# Patient Record
Sex: FEMALE | Race: BLACK OR AFRICAN AMERICAN | NOT HISPANIC OR LATINO | URBAN - METROPOLITAN AREA
[De-identification: names, ages, dates, MRNs, and addresses within clinical notes are randomized per-mention and may not be internally consistent; named-entity substitution may affect disease eponyms.]

---

## 2020-10-10 ENCOUNTER — INPATIENT (INPATIENT)
Facility: HOSPITAL | Age: 85
LOS: 4 days | Discharge: HOME | DRG: 542 | End: 2020-10-15
Attending: INTERNAL MEDICINE | Admitting: INTERNAL MEDICINE
Payer: COMMERCIAL

## 2020-10-10 VITALS
HEIGHT: 72 IN | RESPIRATION RATE: 20 BRPM | DIASTOLIC BLOOD PRESSURE: 84 MMHG | OXYGEN SATURATION: 97 % | HEART RATE: 78 BPM | SYSTOLIC BLOOD PRESSURE: 122 MMHG

## 2020-10-10 DIAGNOSIS — I10 ESSENTIAL (PRIMARY) HYPERTENSION: ICD-10-CM

## 2020-10-10 DIAGNOSIS — Z29.9 ENCOUNTER FOR PROPHYLACTIC MEASURES, UNSPECIFIED: ICD-10-CM

## 2020-10-10 DIAGNOSIS — R53.83 OTHER FATIGUE: ICD-10-CM

## 2020-10-10 DIAGNOSIS — I82.409 ACUTE EMBOLISM AND THROMBOSIS OF UNSPECIFIED DEEP VEINS OF UNSPECIFIED LOWER EXTREMITY: ICD-10-CM

## 2020-10-10 DIAGNOSIS — C50.919 MALIGNANT NEOPLASM OF UNSPECIFIED SITE OF UNSPECIFIED FEMALE BREAST: ICD-10-CM

## 2020-10-10 DIAGNOSIS — E83.52 HYPERCALCEMIA: ICD-10-CM

## 2020-10-10 DIAGNOSIS — N39.0 URINARY TRACT INFECTION, SITE NOT SPECIFIED: ICD-10-CM

## 2020-10-10 LAB
ALBUMIN SERPL ELPH-MCNC: 2.7 G/DL — LOW (ref 3.5–5)
ALP SERPL-CCNC: 183 U/L — HIGH (ref 40–120)
ALT FLD-CCNC: 82 U/L DA — HIGH (ref 10–60)
ANION GAP SERPL CALC-SCNC: 10 MMOL/L — SIGNIFICANT CHANGE UP (ref 5–17)
APPEARANCE UR: ABNORMAL
APTT BLD: 32.1 SEC — SIGNIFICANT CHANGE UP (ref 27.5–35.5)
AST SERPL-CCNC: 146 U/L — HIGH (ref 10–40)
BACTERIA # UR AUTO: ABNORMAL /HPF
BASOPHILS # BLD AUTO: 0.02 K/UL — SIGNIFICANT CHANGE UP (ref 0–0.2)
BASOPHILS NFR BLD AUTO: 0.3 % — SIGNIFICANT CHANGE UP (ref 0–2)
BILIRUB SERPL-MCNC: 1.5 MG/DL — HIGH (ref 0.2–1.2)
BILIRUB UR-MCNC: NEGATIVE — SIGNIFICANT CHANGE UP
BUN SERPL-MCNC: 18 MG/DL — SIGNIFICANT CHANGE UP (ref 7–18)
CALCIUM SERPL-MCNC: 12.3 MG/DL — HIGH (ref 8.4–10.5)
CHLORIDE SERPL-SCNC: 103 MMOL/L — SIGNIFICANT CHANGE UP (ref 96–108)
CO2 SERPL-SCNC: 20 MMOL/L — LOW (ref 22–31)
COLOR SPEC: YELLOW — SIGNIFICANT CHANGE UP
CREAT SERPL-MCNC: 1.28 MG/DL — SIGNIFICANT CHANGE UP (ref 0.5–1.3)
DIFF PNL FLD: ABNORMAL
EOSINOPHIL # BLD AUTO: 0.06 K/UL — SIGNIFICANT CHANGE UP (ref 0–0.5)
EOSINOPHIL NFR BLD AUTO: 0.8 % — SIGNIFICANT CHANGE UP (ref 0–6)
EPI CELLS # UR: ABNORMAL /HPF
GLUCOSE SERPL-MCNC: 131 MG/DL — HIGH (ref 70–99)
GLUCOSE UR QL: NEGATIVE — SIGNIFICANT CHANGE UP
HCT VFR BLD CALC: 39.3 % — SIGNIFICANT CHANGE UP (ref 34.5–45)
HGB BLD-MCNC: 13.4 G/DL — SIGNIFICANT CHANGE UP (ref 11.5–15.5)
IMM GRANULOCYTES NFR BLD AUTO: 0.4 % — SIGNIFICANT CHANGE UP (ref 0–1.5)
INR BLD: 1.12 RATIO — SIGNIFICANT CHANGE UP (ref 0.88–1.16)
KETONES UR-MCNC: NEGATIVE — SIGNIFICANT CHANGE UP
LEUKOCYTE ESTERASE UR-ACNC: ABNORMAL
LYMPHOCYTES # BLD AUTO: 1.79 K/UL — SIGNIFICANT CHANGE UP (ref 1–3.3)
LYMPHOCYTES # BLD AUTO: 24.3 % — SIGNIFICANT CHANGE UP (ref 13–44)
MAGNESIUM SERPL-MCNC: 1.6 MG/DL — SIGNIFICANT CHANGE UP (ref 1.6–2.6)
MCHC RBC-ENTMCNC: 28.5 PG — SIGNIFICANT CHANGE UP (ref 27–34)
MCHC RBC-ENTMCNC: 34.1 GM/DL — SIGNIFICANT CHANGE UP (ref 32–36)
MCV RBC AUTO: 83.4 FL — SIGNIFICANT CHANGE UP (ref 80–100)
MONOCYTES # BLD AUTO: 0.6 K/UL — SIGNIFICANT CHANGE UP (ref 0–0.9)
MONOCYTES NFR BLD AUTO: 8.1 % — SIGNIFICANT CHANGE UP (ref 2–14)
NEUTROPHILS # BLD AUTO: 4.87 K/UL — SIGNIFICANT CHANGE UP (ref 1.8–7.4)
NEUTROPHILS NFR BLD AUTO: 66.1 % — SIGNIFICANT CHANGE UP (ref 43–77)
NITRITE UR-MCNC: POSITIVE
NRBC # BLD: 0 /100 WBCS — SIGNIFICANT CHANGE UP (ref 0–0)
PH UR: 5 — SIGNIFICANT CHANGE UP (ref 5–8)
PLATELET # BLD AUTO: 288 K/UL — SIGNIFICANT CHANGE UP (ref 150–400)
POTASSIUM SERPL-MCNC: 4.6 MMOL/L — SIGNIFICANT CHANGE UP (ref 3.5–5.3)
POTASSIUM SERPL-SCNC: 4.6 MMOL/L — SIGNIFICANT CHANGE UP (ref 3.5–5.3)
PROT SERPL-MCNC: 7.5 G/DL — SIGNIFICANT CHANGE UP (ref 6–8.3)
PROT UR-MCNC: 15
PROTHROM AB SERPL-ACNC: 13.3 SEC — SIGNIFICANT CHANGE UP (ref 10.6–13.6)
RBC # BLD: 4.71 M/UL — SIGNIFICANT CHANGE UP (ref 3.8–5.2)
RBC # FLD: 15.5 % — HIGH (ref 10.3–14.5)
RBC CASTS # UR COMP ASSIST: ABNORMAL /HPF (ref 0–2)
SARS-COV-2 RNA SPEC QL NAA+PROBE: SIGNIFICANT CHANGE UP
SODIUM SERPL-SCNC: 133 MMOL/L — LOW (ref 135–145)
SP GR SPEC: 1.01 — SIGNIFICANT CHANGE UP (ref 1.01–1.02)
TROPONIN I SERPL-MCNC: 0.04 NG/ML — SIGNIFICANT CHANGE UP (ref 0–0.04)
UROBILINOGEN FLD QL: NEGATIVE — SIGNIFICANT CHANGE UP
WBC # BLD: 7.37 K/UL — SIGNIFICANT CHANGE UP (ref 3.8–10.5)
WBC # FLD AUTO: 7.37 K/UL — SIGNIFICANT CHANGE UP (ref 3.8–10.5)
WBC UR QL: ABNORMAL /HPF (ref 0–5)

## 2020-10-10 PROCEDURE — 71046 X-RAY EXAM CHEST 2 VIEWS: CPT | Mod: 26

## 2020-10-10 PROCEDURE — 74177 CT ABD & PELVIS W/CONTRAST: CPT | Mod: 26

## 2020-10-10 PROCEDURE — 71260 CT THORAX DX C+: CPT | Mod: 26

## 2020-10-10 PROCEDURE — 99285 EMERGENCY DEPT VISIT HI MDM: CPT

## 2020-10-10 RX ORDER — SODIUM CHLORIDE 9 MG/ML
1000 INJECTION INTRAMUSCULAR; INTRAVENOUS; SUBCUTANEOUS
Refills: 0 | Status: DISCONTINUED | OUTPATIENT
Start: 2020-10-10 | End: 2020-10-13

## 2020-10-10 RX ORDER — CEFTRIAXONE 500 MG/1
INJECTION, POWDER, FOR SOLUTION INTRAMUSCULAR; INTRAVENOUS
Refills: 0 | Status: DISCONTINUED | OUTPATIENT
Start: 2020-10-10 | End: 2020-10-15

## 2020-10-10 RX ORDER — CEFTRIAXONE 500 MG/1
1000 INJECTION, POWDER, FOR SOLUTION INTRAMUSCULAR; INTRAVENOUS EVERY 24 HOURS
Refills: 0 | Status: DISCONTINUED | OUTPATIENT
Start: 2020-10-11 | End: 2020-10-15

## 2020-10-10 RX ORDER — AMLODIPINE BESYLATE 2.5 MG/1
5 TABLET ORAL DAILY
Refills: 0 | Status: DISCONTINUED | OUTPATIENT
Start: 2020-10-10 | End: 2020-10-15

## 2020-10-10 RX ORDER — CEFTRIAXONE 500 MG/1
1000 INJECTION, POWDER, FOR SOLUTION INTRAMUSCULAR; INTRAVENOUS ONCE
Refills: 0 | Status: COMPLETED | OUTPATIENT
Start: 2020-10-10 | End: 2020-10-10

## 2020-10-10 RX ORDER — ASPIRIN/CALCIUM CARB/MAGNESIUM 324 MG
325 TABLET ORAL ONCE
Refills: 0 | Status: COMPLETED | OUTPATIENT
Start: 2020-10-10 | End: 2020-10-10

## 2020-10-10 RX ORDER — SODIUM CHLORIDE 9 MG/ML
1000 INJECTION INTRAMUSCULAR; INTRAVENOUS; SUBCUTANEOUS ONCE
Refills: 0 | Status: COMPLETED | OUTPATIENT
Start: 2020-10-10 | End: 2020-10-10

## 2020-10-10 RX ADMIN — CEFTRIAXONE 1000 MILLIGRAM(S): 500 INJECTION, POWDER, FOR SOLUTION INTRAMUSCULAR; INTRAVENOUS at 23:36

## 2020-10-10 RX ADMIN — SODIUM CHLORIDE 1000 MILLILITER(S): 9 INJECTION INTRAMUSCULAR; INTRAVENOUS; SUBCUTANEOUS at 18:30

## 2020-10-10 RX ADMIN — SODIUM CHLORIDE 1000 MILLILITER(S): 9 INJECTION INTRAMUSCULAR; INTRAVENOUS; SUBCUTANEOUS at 22:23

## 2020-10-10 RX ADMIN — SODIUM CHLORIDE 100 MILLILITER(S): 9 INJECTION INTRAMUSCULAR; INTRAVENOUS; SUBCUTANEOUS at 21:27

## 2020-10-10 RX ADMIN — SODIUM CHLORIDE 1000 MILLILITER(S): 9 INJECTION INTRAMUSCULAR; INTRAVENOUS; SUBCUTANEOUS at 17:10

## 2020-10-10 NOTE — H&P ADULT - NSHPPHYSICALEXAM_GEN_ALL_CORE
PHYSICAL EXAM:  GENERAL: NAD, speaks in full sentences, no signs of respiratory distress  HEAD:  Atraumatic, Normocephalic  EYES: EOMI, PERRLA, conjunctiva and sclera clear  NECK: Supple, No JVD  CHEST/LUNG: Clear to auscultation bilaterally; No wheeze; No crackles; No accessory muscles used, Mastectomy scar  HEART: Regular rate and rhythm; No murmurs;   ABDOMEN: Soft, Nontender, Nondistended; Bowel sounds present; No guarding  EXTREMITIES:  2+ Peripheral Pulses, No cyanosis or edema  PSYCH: AAOx3  NEUROLOGY: non-focal  SKIN: No rashes or lesions

## 2020-10-10 NOTE — ED PROVIDER NOTE - PMH
Arthritis    Cholecystitis    Diabetes mellitus    Gout    Hyperlipidemia    Hypertension    Vitamin D deficiency

## 2020-10-10 NOTE — ED PROVIDER NOTE - PROGRESS NOTE DETAILS
Hypercalcemia with a calcium level of 12.3 and calcitonin level of 100. Troponin negative. Will obtain CT of chest, abdomen/pelvis. Suspicion of metastatic etiology given hypercalcemia, troponin negative. WBC normal. Will admit for hypercalcemia with imaging and IV antibiotics. radiologist report received. dvt in femoral extending to IVC, metastatic disesae. Dr. Donaldson at bedside. Admitted for inpatient management.

## 2020-10-10 NOTE — ED PROVIDER NOTE - OBJECTIVE STATEMENT
89 y/o F pt with a significant PMHx of breast cancer s/p remission 10 years ago, DM, HTN and no significant PSHx presents to the ED with c/o generalized fatigue. Patient states extremely tired, decreased appetite. Patient reports normal walking is with a cane. Patient reports unable to tolerate walking with a cane. Pt states living with her son, had labs done yesterday. Patient's son states liver and calcium enzymes in the labs were high. Patient denies any syncope, fever, chills, chest pain, shortness of breath, abdominal pain, paresthesia, numbness or any other complains.

## 2020-10-10 NOTE — H&P ADULT - ASSESSMENT
87 y/o F pt with a significant PMHx of breast cancer(finished radiotherapy in July)  HTN and no significant PSHx presents to the ED with c/o generalized fatigue  Admitting for failure to thrive and metastatic cancer evaluation

## 2020-10-10 NOTE — H&P ADULT - PROBLEM SELECTOR PLAN 6
RISK                                                          Points  [] Previous VTE                                           3  [] Thrombophilia                                        2  [] Lower limb paralysis                              2   [x] Current Cancer                                       2   [x] Immobilization > 24 hrs                        1  [] ICU/CCU stay > 24 hours                       1  [x] Age > 60                                                   1    Improve score 4  Heparin drip for DVT Patient takes Amlodipine 5mg   Will continue with parameters   Dash diet  f.u A1C, Lipid profile

## 2020-10-10 NOTE — H&P ADULT - PROBLEM SELECTOR PLAN 5
Patient takes Amlodipine 5mg   Will continue with parameters   Dash diet  f.u A1C, Lipid profile Thrombosis within left femoral vein extending into the IVC. No pulmonary embolus.  Will start on Heparin drip once CT head is back, and there is no evidence of bleed in the brain   - Monitor PTT

## 2020-10-10 NOTE — ED PROVIDER NOTE - CLINICAL SUMMARY MEDICAL DECISION MAKING FREE TEXT BOX
Pt with breast CA and as per above with generalized fatigue. Found to have hypercalcemia yesterday. Evaluate for metabolic abnormalities. Aspecific for ACS, possible METS due to the history and calcium levels. Pt with breast CA and as per above p/w generalized fatigue. Found to have hypercalcemia yesterday. will eval for metabolic derangements. presentation suspicious for metastatic disease. will also eval for cardiac, infectious etiology.

## 2020-10-10 NOTE — H&P ADULT - PROBLEM SELECTOR PLAN 4
Thrombosis within left femoral vein extending into the IVC. No pulmonary embolus.  Will start on Heparin drip once CT head is back, and there is no evidence of bleed in the brain   - Monitor PTT Patient has Calcium 12.3   corrected calcium 13.9  -Most likely due to malignancy  - Will start with IV fluids   -f/u Vitamin D and PTH rP to rule out other causes of malignancy (  - f/u outpaitent records from oncologist for   - Oncologist Dr Gold

## 2020-10-10 NOTE — H&P ADULT - NSICDXPASTMEDICALHX_GEN_ALL_CORE_FT
PAST MEDICAL HISTORY:  Arthritis     Cholecystitis     Diabetes mellitus     Gout     Hyperlipidemia     Hypertension     Vitamin D deficiency

## 2020-10-10 NOTE — ED PROVIDER NOTE - CONSTITUTIONAL APPEARANCE HYGIENE, MLM
nontoxic appearing female lying on the stretcher, patient is clearly fatigued and needs to take a break per sentence

## 2020-10-10 NOTE — H&P ADULT - PROBLEM SELECTOR PLAN 7
RISK                                                          Points  [] Previous VTE                                           3  [] Thrombophilia                                        2  [] Lower limb paralysis                              2   [x] Current Cancer                                       2   [x] Immobilization > 24 hrs                        1  [] ICU/CCU stay > 24 hours                       1  [x] Age > 60                                                   1    Improve score 4  Heparin drip for DVT

## 2020-10-10 NOTE — H&P ADULT - ATTENDING COMMENTS
Seen and examined . Seen and examined  earlier with son in room . Son said that Chemo was stopped in July as her oncologist said she was fine now. Will try to find from her oncologist reason for stopping chemo. CT scan findings explained in detail . Awaiting CT Head and will start Heparin for DVT. Oncology consulted. Progosis is poor. P full code . Will consult palliative also.

## 2020-10-10 NOTE — ED ADULT NURSE NOTE - NSIMPLEMENTINTERV_GEN_ALL_ED
Implemented All Fall Risk Interventions:  [place] to call system. Call bell, personal items and telephone within reach. Instruct patient to call for assistance. Room bathroom lighting operational. Non-slip footwear when patient is off stretcher. Physically safe environment: no spills, clutter or unnecessary equipment. Stretcher in lowest position, wheels locked, appropriate side rails in place. Provide visual cue, wrist band, yellow gown, etc. Monitor gait and stability. Monitor for mental status changes and reorient to person, place, and time. Review medications for side effects contributing to fall risk. Reinforce activity limits and safety measures with patient and family.

## 2020-10-10 NOTE — H&P ADULT - NSHPREVIEWOFSYSTEMS_GEN_ALL_CORE
.  CONSTITUTIONAL: No weakness, fevers or chills  EYES/ENT: No visual changes;  No vertigo or throat pain   NECK: No pain or stiffness  RESPIRATORY: No cough, wheezing, hemoptysis; No shortness of breath  CARDIOVASCULAR: No chest pain or palpitations  GASTROINTESTINAL: No abdominal or epigastric pain. No nausea, vomiting, or hematemesis; No diarrhea or constipation. No melena or hematochezia.  GENITOURINARY: No dysuria, frequency or hematuria  NEUROLOGICAL: No numbness or weakness  SKIN: No itching, burning, rashes, or lesions   All other review of systems is negative unless indicated above. CONSTITUTIONAL: generalized weakness, anorexia, No fevers or chills  EYES/ENT: No visual changes;  No vertigo or throat pain   NECK: No pain or stiffness  RESPIRATORY: No cough, wheezing, hemoptysis; No shortness of breath  CARDIOVASCULAR: No chest pain or palpitations  GASTROINTESTINAL: No abdominal or epigastric pain. No nausea, vomiting, or hematemesis; No diarrhea or constipation. No melena or hematochezia.  GENITOURINARY: No dysuria, frequency or hematuria  NEUROLOGICAL: No numbness or weakness  SKIN: No itching, burning, rashes, or lesions   All other review of systems is negative unless indicated above.

## 2020-10-10 NOTE — ED PROVIDER NOTE - CHPI ED SYMPTOMS NEG
no numbness/no chest pain, no shortness of breath, no abdominal pain, no paresthesia,/no fever/no chills

## 2020-10-10 NOTE — H&P ADULT - PROBLEM SELECTOR PLAN 3
Patient has Calcium 12.3   corrected calcium 13.9  -Most likely due to malignancy  - Will start with IV fluids   -f/u Vitamin D and PTH rP to rule out other causes of malignancy (  - f/u outpaitent records from oncologist for   - Oncologist Dr Gold patient presents with generalized fatigue , weakness, anorexia and weight loss since 1 week  - Also reports significant weight loss in last 1 month  - H/O Breast cancer, stopped radiation therapy in July because as per patient, her oncologist recommended that her cancer has gone  - Pan CT of the body shows hypodense lesion in the liver, bones and irregular bladder wall thickening  - UA also positive , No fever or Wbc   - Calcium 12.3 and Albumin 2.7  - Likely due to metastatic cancer   - Will start with NS at 100ml/hr for 24 hours for initial management of hypercalcemia  - f/u Vit D1-25 , PTHrP,   - f/u with Dr Gold for further recommendations   - outpatient records from the oncologist

## 2020-10-10 NOTE — H&P ADULT - PROBLEM SELECTOR PLAN 1
patient presents with generalized fatigue , weakness, anorexia and weight loss since 1 week  - Also reports significant weight loss in last 1 month  - H/O Breast cancer, stopped radiation therapy in July because as per patient, her oncologist recommended that her cancer has gone  - Pan CT of the body shows hypodense lesion in the liver, bones and irregular bladder wall thickening  - UA also positive , No fever or Wbc   - Calcium 12.3 and Albumin 2.7  - Likely due to metastatic cancer   - Will start with NS at 100ml/hr for 24 hours for initial management of hypercalcemia  - f/u Vit D1-25 , PTHrP,   - f/u with Dr Gold for further recommendations

## 2020-10-10 NOTE — H&P ADULT - PROBLEM SELECTOR PLAN 2
patient presents with generalized fatigue , weakness, anorexia and weight loss since 1 week  - Also reports significant weight loss in last 1 month  - H/O Breast cancer, stopped radiation therapy in July because as per patient, her oncologist recommended that her cancer has gone  - Pan CT of the body shows hypodense lesion in the liver, bones and irregular bladder wall thickening  - UA also positive , No fever or Wbc   - Calcium 12.3 and Albumin 2.7  - Likely due to metastatic cancer   - Will start with NS at 100ml/hr for 24 hours for initial management of hypercalcemia  - f/u Vit D1-25 , PTHrP,   - f/u with Dr Gold for further recommendations   - outpatient records from the oncologist - patient presents with generalized weakness  - positive UA , no other signs of infection   - afebrile   - no Leukocytosis  - Lactate normal   - Will start with Rocephin   - f/u blood cultures (specimen received)   - f/u urine cultures (specimen received)

## 2020-10-10 NOTE — ED PROVIDER NOTE - MUSCULOSKELETAL, MLM
Spine appears normal, range of motion is not limited, no muscle or joint tenderness, no pitting edema

## 2020-10-10 NOTE — H&P ADULT - NSICDXFAMILYHX_GEN_ALL_CORE_FT
FAMILY HISTORY:  Father  Still living? Unknown  Family history of hypertension, Age at diagnosis: Age Unknown    Mother  Still living? Unknown  Family history of diabetes mellitus (DM), Age at diagnosis: Age Unknown  Family history of heart disease, Age at diagnosis: Age Unknown    Sibling  Still living? Unknown  Family history of diabetes mellitus (DM), Age at diagnosis: Age Unknown

## 2020-10-10 NOTE — H&P ADULT - HISTORY OF PRESENT ILLNESS
87 y/o F pt with a significant PMHx of breast cancer s/p remission 10 years ago, DM, HTN and no significant PSHx presents to the ED with c/o generalized fatigue. Patient states extremely tired, decreased appetite. Patient reports normal walking is with a cane. Patient reports unable to tolerate walking with a cane. Pt states living with her son, had labs done yesterday. Patient's son states liver and calcium enzymes in the labs were high. Patient denies any syncope, fever, chills, chest pain, shortness of breath, abdominal pain, paresthesia, numbness or any other complains. 87 y/o F pt with a significant PMHx of breast cancer(finished radiotherapy in July)  HTN , questionable diabetes and no significant PSHx presents to the ED with c/o generalized fatigue. Patient states extremely tired, decreased appetite, unable to walk, since 1 week associated with significant weight loss since a month. Pt states living with her son, had labs done yesterday which showed that her calcium and liver enzyme levels are elevated.   Patient denies any syncope, fever, chills, chest pain, shortness of breath, abdominal pain, paresthesia, numbness or any other complains.    ED Course:   CT abdomen and Chest with IV contrast shows metastatic lesions in liver, lungs and osteolytic lesions in spine, Clots in femoral vein extending upto IVC  Vital Signs Last 24 Hrs  T(C): 37.1 (10 Oct 2020 19:45), Max: 37.1 (10 Oct 2020 19:45)  T(F): 98.8 (10 Oct 2020 19:45), Max: 98.8 (10 Oct 2020 19:45)  HR: 82 (10 Oct 2020 19:45) (78 - 82)  BP: 131/81 (10 Oct 2020 19:45) (122/84 - 131/81)  RR: 16 (10 Oct 2020 19:45) (16 - 20)  SpO2: 97% (10 Oct 2020 19:45) (97% - 97%)

## 2020-10-11 LAB
24R-OH-CALCIDIOL SERPL-MCNC: 40.8 NG/ML — SIGNIFICANT CHANGE UP (ref 30–80)
ALBUMIN SERPL ELPH-MCNC: 2.2 G/DL — LOW (ref 3.5–5)
ALP SERPL-CCNC: 158 U/L — HIGH (ref 40–120)
ALT FLD-CCNC: 72 U/L DA — HIGH (ref 10–60)
ANION GAP SERPL CALC-SCNC: 8 MMOL/L — SIGNIFICANT CHANGE UP (ref 5–17)
APTT BLD: 44.3 SEC — HIGH (ref 27.5–35.5)
APTT BLD: >200 SEC — CRITICAL HIGH (ref 27.5–35.5)
AST SERPL-CCNC: 137 U/L — HIGH (ref 10–40)
BILIRUB SERPL-MCNC: 1 MG/DL — SIGNIFICANT CHANGE UP (ref 0.2–1.2)
BUN SERPL-MCNC: 15 MG/DL — SIGNIFICANT CHANGE UP (ref 7–18)
CALCIUM SERPL-MCNC: 11.2 MG/DL — HIGH (ref 8.4–10.5)
CHLORIDE SERPL-SCNC: 104 MMOL/L — SIGNIFICANT CHANGE UP (ref 96–108)
CHOLEST SERPL-MCNC: 94 MG/DL — SIGNIFICANT CHANGE UP (ref 10–199)
CO2 SERPL-SCNC: 24 MMOL/L — SIGNIFICANT CHANGE UP (ref 22–31)
CREAT SERPL-MCNC: 1.1 MG/DL — SIGNIFICANT CHANGE UP (ref 0.5–1.3)
FERRITIN SERPL-MCNC: 796 NG/ML — HIGH (ref 15–150)
FOLATE SERPL-MCNC: 5 NG/ML — SIGNIFICANT CHANGE UP
GLUCOSE BLDC GLUCOMTR-MCNC: 104 MG/DL — HIGH (ref 70–99)
GLUCOSE BLDC GLUCOMTR-MCNC: 106 MG/DL — HIGH (ref 70–99)
GLUCOSE BLDC GLUCOMTR-MCNC: 106 MG/DL — HIGH (ref 70–99)
GLUCOSE BLDC GLUCOMTR-MCNC: 130 MG/DL — HIGH (ref 70–99)
GLUCOSE SERPL-MCNC: 115 MG/DL — HIGH (ref 70–99)
HCT VFR BLD CALC: 33.8 % — LOW (ref 34.5–45)
HDLC SERPL-MCNC: 53 MG/DL — SIGNIFICANT CHANGE UP
HGB BLD-MCNC: 11.6 G/DL — SIGNIFICANT CHANGE UP (ref 11.5–15.5)
IRON SATN MFR SERPL: 24 % — SIGNIFICANT CHANGE UP (ref 15–50)
IRON SATN MFR SERPL: 46 UG/DL — SIGNIFICANT CHANGE UP (ref 40–150)
LIPID PNL WITH DIRECT LDL SERPL: 31 MG/DL — SIGNIFICANT CHANGE UP
MAGNESIUM SERPL-MCNC: 1.6 MG/DL — SIGNIFICANT CHANGE UP (ref 1.6–2.6)
MCHC RBC-ENTMCNC: 28.5 PG — SIGNIFICANT CHANGE UP (ref 27–34)
MCHC RBC-ENTMCNC: 34.3 GM/DL — SIGNIFICANT CHANGE UP (ref 32–36)
MCV RBC AUTO: 83 FL — SIGNIFICANT CHANGE UP (ref 80–100)
NRBC # BLD: 0 /100 WBCS — SIGNIFICANT CHANGE UP (ref 0–0)
PHOSPHATE SERPL-MCNC: 2 MG/DL — LOW (ref 2.5–4.5)
PLATELET # BLD AUTO: 241 K/UL — SIGNIFICANT CHANGE UP (ref 150–400)
POTASSIUM SERPL-MCNC: 4.1 MMOL/L — SIGNIFICANT CHANGE UP (ref 3.5–5.3)
POTASSIUM SERPL-SCNC: 4.1 MMOL/L — SIGNIFICANT CHANGE UP (ref 3.5–5.3)
PROT SERPL-MCNC: 6.5 G/DL — SIGNIFICANT CHANGE UP (ref 6–8.3)
RBC # BLD: 4.07 M/UL — SIGNIFICANT CHANGE UP (ref 3.8–5.2)
RBC # FLD: 15.6 % — HIGH (ref 10.3–14.5)
SARS-COV-2 IGG SERPL QL IA: NEGATIVE — SIGNIFICANT CHANGE UP
SARS-COV-2 IGM SERPL IA-ACNC: 0.09 INDEX — SIGNIFICANT CHANGE UP
SODIUM SERPL-SCNC: 136 MMOL/L — SIGNIFICANT CHANGE UP (ref 135–145)
TIBC SERPL-MCNC: 191 UG/DL — LOW (ref 250–450)
TOTAL CHOLESTEROL/HDL RATIO MEASUREMENT: 1.8 RATIO — LOW (ref 3.3–7.1)
TRIGL SERPL-MCNC: 50 MG/DL — SIGNIFICANT CHANGE UP (ref 10–149)
TSH SERPL-MCNC: 0.5 UU/ML — SIGNIFICANT CHANGE UP (ref 0.34–4.82)
UIBC SERPL-MCNC: 145 UG/DL — SIGNIFICANT CHANGE UP (ref 110–370)
VIT B12 SERPL-MCNC: 1830 PG/ML — HIGH (ref 232–1245)
WBC # BLD: 7 K/UL — SIGNIFICANT CHANGE UP (ref 3.8–10.5)
WBC # FLD AUTO: 7 K/UL — SIGNIFICANT CHANGE UP (ref 3.8–10.5)

## 2020-10-11 PROCEDURE — 70450 CT HEAD/BRAIN W/O DYE: CPT | Mod: 26

## 2020-10-11 RX ORDER — HEPARIN SODIUM 5000 [USP'U]/ML
6500 INJECTION INTRAVENOUS; SUBCUTANEOUS EVERY 6 HOURS
Refills: 0 | Status: DISCONTINUED | OUTPATIENT
Start: 2020-10-11 | End: 2020-10-12

## 2020-10-11 RX ORDER — INFLUENZA VIRUS VACCINE 15; 15; 15; 15 UG/.5ML; UG/.5ML; UG/.5ML; UG/.5ML
0.5 SUSPENSION INTRAMUSCULAR ONCE
Refills: 0 | Status: DISCONTINUED | OUTPATIENT
Start: 2020-10-11 | End: 2020-10-15

## 2020-10-11 RX ORDER — HEPARIN SODIUM 5000 [USP'U]/ML
3000 INJECTION INTRAVENOUS; SUBCUTANEOUS EVERY 6 HOURS
Refills: 0 | Status: DISCONTINUED | OUTPATIENT
Start: 2020-10-11 | End: 2020-10-12

## 2020-10-11 RX ORDER — HEPARIN SODIUM 5000 [USP'U]/ML
INJECTION INTRAVENOUS; SUBCUTANEOUS
Qty: 25000 | Refills: 0 | Status: DISCONTINUED | OUTPATIENT
Start: 2020-10-11 | End: 2020-10-12

## 2020-10-11 RX ADMIN — HEPARIN SODIUM 1400 UNIT(S)/HR: 5000 INJECTION INTRAVENOUS; SUBCUTANEOUS at 19:53

## 2020-10-11 RX ADMIN — HEPARIN SODIUM 3000 UNIT(S): 5000 INJECTION INTRAVENOUS; SUBCUTANEOUS at 11:31

## 2020-10-11 RX ADMIN — AMLODIPINE BESYLATE 5 MILLIGRAM(S): 2.5 TABLET ORAL at 05:42

## 2020-10-11 RX ADMIN — CEFTRIAXONE 100 MILLIGRAM(S): 500 INJECTION, POWDER, FOR SOLUTION INTRAMUSCULAR; INTRAVENOUS at 21:20

## 2020-10-11 RX ADMIN — SODIUM CHLORIDE 100 MILLILITER(S): 9 INJECTION INTRAMUSCULAR; INTRAVENOUS; SUBCUTANEOUS at 05:01

## 2020-10-11 RX ADMIN — HEPARIN SODIUM 1700 UNIT(S)/HR: 5000 INJECTION INTRAVENOUS; SUBCUTANEOUS at 11:30

## 2020-10-11 RX ADMIN — HEPARIN SODIUM 1500 UNIT(S)/HR: 5000 INJECTION INTRAVENOUS; SUBCUTANEOUS at 05:00

## 2020-10-11 RX ADMIN — HEPARIN SODIUM 0 UNIT(S)/HR: 5000 INJECTION INTRAVENOUS; SUBCUTANEOUS at 18:45

## 2020-10-11 NOTE — DIETITIAN INITIAL EVALUATION ADULT. - OTHER INFO
Patient from home lives with son. Visited pt. alert but weak, son at bedside, per pt. poor intake became very poor x 1 week, consuming less than 1 meal or "nothing", has no "desire to eat", denies nausea/vomiting or diarrhea but complains of severe weakness & completed her chemo tx. in July 2020 PTA, has upper/lower dentures. Per pt.  Lbs & gradually loss >35 Lbs past 1 year & current wt. 188 Lbs, presently consuming ~30% of lunch meal, willing to have nutrition supplement with meals, encourage po intake with meal set up, followed by Nephro, Palliative Care team consulted. d/w RN.

## 2020-10-11 NOTE — DIETITIAN INITIAL EVALUATION ADULT. - DIET TYPE
soft/Nursing to continue feeding assistance and encouragement, aspiration precaution/DASH/TLC (sodium and cholesterol restricted diet)/consistent carbohydrate (evening snack)

## 2020-10-11 NOTE — DIETITIAN INITIAL EVALUATION ADULT. - PROBLEM SELECTOR PLAN 4
Patient has Calcium 12.3   corrected calcium 13.9  -Most likely due to malignancy  - Will start with IV fluids   -f/u Vitamin D and PTH rP to rule out other causes of malignancy (  - f/u outpaitent records from oncologist for   - Oncologist Dr Gold

## 2020-10-11 NOTE — CONSULT NOTE ADULT - ASSESSMENT
HYPERCALCEMIA: This is associate with bony lesions  and hepatic lesions, likely mets PO4 is low so possible  PTH related peptide induced.  However, may also be due to direct bone mets. Mild DALY associated.  - Hydrate with NS at 100 ml/hour.  - Follow up BMP.  - No Furosemide.  - PTH and PTHrP levels.  - If no response, Biphosphonate use as per Oncology.     DALY:  Likely pre-Renal from Dehydration due to Calcium induced diuresis.  - Hydrate.  - Follow up BMP.

## 2020-10-11 NOTE — DIETITIAN INITIAL EVALUATION ADULT. - PERTINENT MEDS FT
MEDICATIONS  (STANDING):  amLODIPine   Tablet 5 milliGRAM(s) Oral daily  aspirin 325 milliGRAM(s) Oral Once  cefTRIAXone   IVPB      cefTRIAXone   IVPB 1000 milliGRAM(s) IV Intermittent every 24 hours  heparin  Infusion.  Unit(s)/Hr (15 mL/Hr) IV Continuous <Continuous>  influenza   Vaccine 0.5 milliLiter(s) IntraMuscular once  sodium chloride 0.9%. 1000 milliLiter(s) (100 mL/Hr) IV Continuous <Continuous>  sodium chloride 0.9%. 1000 milliLiter(s) (100 mL/Hr) IV Continuous <Continuous>    MEDICATIONS  (PRN):  heparin   Injectable 6500 Unit(s) IV Push every 6 hours PRN For aPTT less than 40  heparin   Injectable 3000 Unit(s) IV Push every 6 hours PRN For aPTT between 40 - 57

## 2020-10-11 NOTE — CONSULT NOTE ADULT - SUBJECTIVE AND OBJECTIVE BOX
RENETTA BARRETO  Patient is a 88y old  Female who presents with a chief complaint of Generalized weakness (10 Oct 2020 20:00)    HPI:  This is a patient with Breast Ca who was treated with Radiation, admitted for weakness, anorexia and tiredness.   Noted to have Hypercalcemia.     PAST MEDICAL & SURGICAL HISTORY:  Vitamin D deficiency    Gout    Diabetes mellitus    Cholecystitis    Arthritis    Hyperlipidemia    Hypertension    History of cholecystectomy      MEDICATIONS  (STANDING):  amLODIPine   Tablet 5 milliGRAM(s) Oral daily  aspirin 325 milliGRAM(s) Oral Once  cefTRIAXone   IVPB      cefTRIAXone   IVPB 1000 milliGRAM(s) IV Intermittent every 24 hours  heparin  Infusion.  Unit(s)/Hr (15 mL/Hr) IV Continuous <Continuous>  influenza   Vaccine 0.5 milliLiter(s) IntraMuscular once  sodium chloride 0.9%. 1000 milliLiter(s) (100 mL/Hr) IV Continuous <Continuous>  sodium chloride 0.9%. 1000 milliLiter(s) (100 mL/Hr) IV Continuous <Continuous>    Allergies    Benadryl (Swelling)    Intolerances      FAMILY HISTORY:  Family history of heart disease (Mother)    Family history of diabetes mellitus (DM) (Mother, Sibling)    Family history of hypertension (Father)        REVIEW OF SYSTEMS    General:  No fever, chills or night sweats.    Ophthalmologic: No changes in vision.  	  ENMT: No difficulty swallowing. 	    Respiratory and Thorax: No cough, wheezes or dyspnea.  	  Cardiovascular: No chest pains, tiredness or palpitations.	    Gastrointestinal: No dyspepsia, constipation or diarrhea.	    Genitourinary:	 No dysuria, hematuria or frequency.    Musculoskeletal: No joint pains or swelling. No muscle pains.    Neurological:	No weakness or numbness. No seizures.    Hematology/Lymphatics: No heat or cold intolerance.    Endocrine: No polyuria or polydipsia.	      Vital Signs Last 24 Hrs  T(C): 36.7 (11 Oct 2020 14:30), Max: 37.1 (10 Oct 2020 19:45)  T(F): 98.1 (11 Oct 2020 14:30), Max: 98.8 (10 Oct 2020 19:45)  HR: 74 (11 Oct 2020 14:30) (71 - 82)  BP: 127/80 (11 Oct 2020 14:30) (127/80 - 159/88)  BP(mean): --  RR: 16 (11 Oct 2020 14:30) (16 - 16)  SpO2: 95% (11 Oct 2020 14:30) (95% - 98%)    PHYSICAL EXAMINATION:  Constitutional: She appears comfortable and not distressed. Not diaphoretic. Clinically dehydrated.     Neck:  The thyroid is normal. Trachea is midline.     Respiratory: The lungs are clear to auscultation. No dullness and expansion is normal.    Cardiovascular: S1 and S2 are normal. No mummurs, rubs or gallops are present.    Gastrointestinal: The abdomen is soft. No tenderness is present. No masses are present. Bowel sounds are normal.    Genitourinary: The bladder is not distended. No CVA tenderness is present.    Extremities: No edema is noted. No deformities are present.    Neurological: Cognition is normal. Tone, power and sensation are normal.     Skin: No lesions are seen  or palpated.    Lymph Nodes: No lymphadenopathy is present.    Psychiatric: Mood is appropriate. No hallucinations or flight of ideas are noted.                            11.6   7.00  )-----------( 241      ( 11 Oct 2020 11:08 )             33.8     10-11    136  |  104  |  15  ----------------------------<  115<H>  4.1   |  24  |  1.10    Ca    11.2<H>      11 Oct 2020 11:08  Phos  2.0     10-11  Mg     1.6     10-11    TPro  6.5  /  Alb  2.2<L>  /  TBili  1.0  /  DBili  x   /  AST  137<H>  /  ALT  72<H>  /  AlkPhos  158<H>  10-11    LIVER FUNCTIONS - ( 11 Oct 2020 11:08 )  Alb: 2.2 g/dL / Pro: 6.5 g/dL / ALK PHOS: 158 U/L / ALT: 72 U/L DA / AST: 137 U/L / GGT: x           Urinalysis Basic - ( 10 Oct 2020 22:28 )    Color: Yellow / Appearance: Slightly Turbid / S.015 / pH: x  Gluc: x / Ketone: Negative  / Bili: Negative / Urobili: Negative   Blood: x / Protein: 15 / Nitrite: Positive   Leuk Esterase: Small / RBC: 2-5 /HPF / WBC 6-10 /HPF   Sq Epi: x / Non Sq Epi: Moderate /HPF / Bacteria: TNTC /HPF

## 2020-10-11 NOTE — CHART NOTE - NSCHARTNOTEFT_GEN_A_CORE
Upon Nutritional Assessment by the Registered Dietitian your patient was determined to meet criteria / has evidence of the following diagnosis/diagnoses:          [ ]  Mild Protein Calorie Malnutrition        [ ]  Moderate Protein Calorie Malnutrition        [X ] Severe Protein Calorie Malnutrition        [ ] Unspecified Protein Calorie Malnutrition        [ ] Underweight / BMI <19        [ ] Morbid Obesity / BMI > 40      Findings as based on:  •  Comprehensive nutrition assessment and consultation  •  Calorie counts (nutrient intake analysis)  •  Food acceptance and intake status from observations by staff  •  Follow up  •  Patient education  •  Intervention secondary to interdisciplinary rounds  •   concerns      Treatment:    The following diet has been recommended:      PROVIDER Section:     By signing this assessment you are acknowledging and agree with the diagnosis/diagnoses assigned by the Registered Dietitian    Comments:  Add Glucerna Shake 1can bid (440kcal, 20g protein) & MVI/minerals daily as medically feasible

## 2020-10-11 NOTE — DIETITIAN INITIAL EVALUATION ADULT. - PERTINENT LABORATORY DATA
10-11 Na136 mmol/L Glu 115 mg/dL<H> K+ 4.1 mmol/L Cr  1.10 mg/dL BUN 15 mg/dL 10-11 Phos 2.0 mg/dL<L> 10-11 Alb 2.2 g/dL<L> 10-11 Chol 94 mg/dL LDL 31 mg/dL HDL 53 mg/dL Trig 50 mg/dL

## 2020-10-11 NOTE — CHART NOTE - NSCHARTNOTEFT_GEN_A_CORE
EVENT: Paged by RN, PTT is >200.     HPI:  87 y/o F pt with a significant PMHx of breast cancer(finished radiotherapy in July)  HTN , questionable diabetes and no significant PSHx presents to the ED with c/o generalized fatigue. Patient states extremely tired, decreased appetite, unable to walk, since 1 week associated with significant weight loss since a month. Pt states living with her son, had labs done yesterday which showed that her calcium and liver enzyme levels are elevated. Patient denies any syncope, fever, chills, chest pain, shortness of breath, abdominal pain, paresthesia, numbness or any other complains. ED Course:   CT abdomen and Chest with IV contrast shows metastatic lesions in liver, lungs and osteolytic lesions in spine, Clots in femoral vein extending up to IVC. Pt was started on heparin gtt.     SUBJECTIVE: No new complaints     OBJECTIVE:  Vital Signs Last 24 Hrs  T(C): 36.7 (11 Oct 2020 14:30), Max: 37.1 (10 Oct 2020 19:45)  T(F): 98.1 (11 Oct 2020 14:30), Max: 98.8 (10 Oct 2020 19:45)  HR: 74 (11 Oct 2020 14:30) (71 - 82)  BP: 127/80 (11 Oct 2020 14:30) (127/80 - 159/88)  BP(mean): --  RR: 16 (11 Oct 2020 14:30) (16 - 16)  SpO2: 95% (11 Oct 2020 14:30) (95% - 98%)    LABS:                        11.6   7.00  )-----------( 241      ( 11 Oct 2020 11:08 )             33.8   CARDIAC MARKERS ( 10 Oct 2020 17:20 )  0.037 ng/mL / x     / x     / x     / x        10-11    136  |  104  |  15  ----------------------------<  115<H>  4.1   |  24  |  1.10    Ca    11.2<H>      11 Oct 2020 11:08  Phos  2.0     10-11  Mg     1.6     10-11    TPro  6.5  /  Alb  2.2<L>  /  TBili  1.0  /  DBili  x   /  AST  137<H>  /  ALT  72<H>  /  AlkPhos  158<H>  10-11        Vital Signs Last 24 Hrs  T(C): 37.1 (10 Oct 2020 19:45), Max: 37.1 (10 Oct 2020 19:45)  T(F): 98.8 (10 Oct 2020 19:45), Max: 98.8 (10 Oct 2020 19:45)  HR: 82 (10 Oct 2020 19:45) (78 - 82)  BP: 131/81 (10 Oct 2020 19:45) (122/84 - 131/81)  RR: 16 (10 Oct 2020 19:45) (16 - 20)  SpO2: 97% (10 Oct 2020 19:45) (97% - 97%) (10 Oct 2020 20:00)      PLAN:   -Heparin gtt held x 1 hr per nomogram  -Restart at new rate, per nomogram  -Monitor PTT  -Discussed with RN EVENT: Paged by RN, PTT is >200.     HPI:  89 y/o F pt with a significant PMHx of breast cancer(finished radiotherapy in July)  HTN , questionable diabetes and no significant PSHx presents to the ED with c/o generalized fatigue. Patient states extremely tired, decreased appetite, unable to walk, since 1 week associated with significant weight loss since a month. Pt states living with her son, had labs done yesterday which showed that her calcium and liver enzyme levels are elevated. Patient denies any syncope, fever, chills, chest pain, shortness of breath, abdominal pain, paresthesia, numbness or any other complains. ED Course:   CT abdomen and Chest with IV contrast shows metastatic lesions in liver, lungs and osteolytic lesions in spine, Clots in femoral vein extending up to IVC. Pt was started on heparin gtt.     SUBJECTIVE: No new complaints     OBJECTIVE:  Vital Signs Last 24 Hrs  T(C): 36.7 (11 Oct 2020 14:30), Max: 37.1 (10 Oct 2020 19:45)  T(F): 98.1 (11 Oct 2020 14:30), Max: 98.8 (10 Oct 2020 19:45)  HR: 74 (11 Oct 2020 14:30) (71 - 82)  BP: 127/80 (11 Oct 2020 14:30) (127/80 - 159/88)  BP(mean): --  RR: 16 (11 Oct 2020 14:30) (16 - 16)  SpO2: 95% (11 Oct 2020 14:30) (95% - 98%)    LABS:                        11.6   7.00  )-----------( 241      ( 11 Oct 2020 11:08 )             33.8   CARDIAC MARKERS ( 10 Oct 2020 17:20 )  0.037 ng/mL / x     / x     / x     / x        10-11    136  |  104  |  15  ----------------------------<  115<H>  4.1   |  24  |  1.10    Ca    11.2<H>      11 Oct 2020 11:08  Phos  2.0     10-11  Mg     1.6     10-11    TPro  6.5  /  Alb  2.2<L>  /  TBili  1.0  /  DBili  x   /  AST  137<H>  /  ALT  72<H>  /  AlkPhos  158<H>  10-11        Vital Signs Last 24 Hrs  T(C): 37.1 (10 Oct 2020 19:45), Max: 37.1 (10 Oct 2020 19:45)  T(F): 98.8 (10 Oct 2020 19:45), Max: 98.8 (10 Oct 2020 19:45)  HR: 82 (10 Oct 2020 19:45) (78 - 82)  BP: 131/81 (10 Oct 2020 19:45) (122/84 - 131/81)  RR: 16 (10 Oct 2020 19:45) (16 - 20)  SpO2: 97% (10 Oct 2020 19:45) (97% - 97%) (10 Oct 2020 20:00)      PLAN:   -Heparin gtt to be held x 1 hr per nomogram  -Restart at new rate, per nomogram  -Monitor PTT  -Discussed with RN

## 2020-10-11 NOTE — DIETITIAN INITIAL EVALUATION ADULT. - PROBLEM SELECTOR PLAN 2
- patient presents with generalized weakness  - positive UA , no other signs of infection   - afebrile   - no Leukocytosis  - Lactate normal   - Will start with Rocephin   - f/u blood cultures (specimen received)   - f/u urine cultures (specimen received)

## 2020-10-11 NOTE — DIETITIAN INITIAL EVALUATION ADULT. - PROBLEM SELECTOR PLAN 5
Thrombosis within left femoral vein extending into the IVC. No pulmonary embolus.  Will start on Heparin drip once CT head is back, and there is no evidence of bleed in the brain   - Monitor PTT

## 2020-10-11 NOTE — CHART NOTE - NSCHARTNOTEFT_GEN_A_CORE
EVENT: VESSELS: Thrombosis within left femoral vein extending into the IVC.    BRIEF HPI:89 y/o F pt with a significant PMHx of breast cancer(finished radiotherapy in July)  HTN , questionable diabetes and no significant PSHx presents to the ED with c/o generalized fatigue. Patient states extremely tired, decreased appetite, unable to walk, since 1 week associated with significant weight loss since a month. Pt states living with her son, had labs done yesterday which showed that her calcium and liver enzyme levels are elevated. CT abdomen shows thrombosis within left femoral vein. CT head negative for ICH.    OBJECTIVE:  Vital Signs Last 24 Hrs  T(C): 36.4 (11 Oct 2020 00:06), Max: 37.1 (10 Oct 2020 19:45)  T(F): 97.6 (11 Oct 2020 00:06), Max: 98.8 (10 Oct 2020 19:45)  HR: 71 (11 Oct 2020 00:06) (71 - 82)  BP: 159/88 (11 Oct 2020 00:06) (122/84 - 159/88)  BP(mean): --  RR: 16 (11 Oct 2020 00:06) (16 - 20)  SpO2: 96% (11 Oct 2020 00:06) (96% - 97%)    FOCUSED PHYSICAL EXAM:  NEURO: Drowsy, oriented X 3  RESP: Even, unlabored  CV: S1 S2, regular  EXT: pedal pulses present    LABS:                        13.4   7.37  )-----------( 288      ( 10 Oct 2020 17:20 )             39.3   CARDIAC MARKERS ( 10 Oct 2020 17:20 )  0.037 ng/mL / x     / x     / x     / x        10-10    133<L>  |  103  |  18  ----------------------------<  131<H>  4.6   |  20<L>  |  1.28    Ca    12.3<H>      10 Oct 2020 17:20  Mg     1.6     10-10    TPro  7.5  /  Alb  2.7<L>  /  TBili  1.5<H>  /  DBili  x   /  AST  146<H>  /  ALT  82<H>  /  AlkPhos  183<H>  10-10    IMAGING:  EXAM:  CT BRAIN                        PROCEDURE DATE:  10/11/2020    INTERPRETATION:  CT HEAD WITHOUT CONTRAST  INDICATION: 88 years old. Female. metastatic disease H/o breast cancer. Weakness  COMPARISON: None available.  TECHNIQUE: Noncontrast axial CT head was obtained from the skull base to vertex.  FINDINGS:  No acute intracranial hemorrhage, mass effect or midline shift.  No CT evidence of acute large territory vascular infarct.  The ventricles and cortical sulci are within normal limits for age.  Patchy hypo densities in the periventricular white matter are nonspecific, but likely sequela of small vessel ischemic disease.  Small mucus retention cyst/polyp in the left maxillary. The mastoid air cells are well aerated.  No displaced calvarial fracture.  IMPRESSION:  No acute intracranial hemorrhage or mass effect. Further evaluation with MRI may be performed as clinically indicated.    EXAM:  CT ABDOMEN AND PELVIS IC                        EXAM:  CT CHEST IC                        PROCEDURE DATE:  10/10/2020    INTERPRETATION:  CLINICAL INFORMATION: Hypercalcemia, fatigue, evaluate for metastases  COMPARISON: None.  PROCEDURE:  CT of the Chest, Abdomen and Pelvis was performed with intravenous contrast.  Intravenous contrast: 90 ml Omnipaque 350. 10 ml discarded.  Oral contrast: None.  Sagittal and coronal reformats were performed.  FINDINGS:  CHEST:  LUNGS AND LARGE AIRWAYS: Patent central airways. Indeterminant 3 mm right upper lobe nodule (series 2 image 46). Small calcified granuloma within the right upper lobe. Mild subpleural scarring/linear atelectasis within the right lower lobe.  PLEURA: No pleural effusion.  VESSELS: No pulmonary embolus. No aortic aneurysm or dissection.  HEART: Heart size is normal. No pericardial effusion. Mild coronary calcifications.  1.3 cm short axis subcarinal lymph node.  CHEST WALL AND LOWER NECK: Tiny nodules within the thyroid gland. Right breast skin thickening and irregular asymmetric subareolar density. 2.7 x 1.4 cm necrotic lymph nodes within the right axilla.  ABDOMEN AND PELVIS:  LIVER: Small hepatic cysts. Multiple hypodense lesions measuring up to 2 cm within the liver parenchyma likely metastatic disease. Nodular contour of the liver.  BILE DUCTS: Mild biliary ductal dilatation.  GALLBLADDER: Not visualized.  SPLEEN: Within normal limits.  PANCREAS: Within normal limits.  ADRENALS: Within normal limits.  KIDNEYS/URETERS: Within normal limits.  BLADDER: Wall thickening/irregularity  REPRODUCTIVE ORGANS: Hysterectomy.  BOWEL: No bowel obstruction. Appendix not visualized.  PERITONEUM: Trace fluid within the cul-de-sac.  VESSELS: Thrombosis within left femoral vein extending into the IVC. The abdominal aorta and its branches are patent.  RETROPERITONEUM/LYMPH NODES: No lymphadenopathy.  ABDOMINAL WALL: Within normal limits.  BONES: Hypodense lesions with cortical destruction within the left 2nd and 3rd ribs likely metastatic. Lucent bone lesions within the bilateral iliac and femoral bones are indeterminant.  1.3 cm short axis subcarinal lymph node.  MEDIASTINUM AND CATALINO: Borderline mediastinal lymph nodes includin.5 cm short axis left lower   IMPRESSION:  Thrombosis within left femoral vein extending into the IVC. No pulmonary embolus.  Multiple hypodense lesions measuring up to 2 cm within the liver parenchyma likely metastatic disease.  Hypodense lesions within the left 2nd and 3rd ribs with cortical destruction likely metastatic. Lucent bone lesions within the bilateral iliac and femoral bones are indeterminant. Further evaluation can be performed with PET-CT or bone scan.  Right breast skin thickening and irregular asymmetric subareolar density. 2.7 x 1.4 cm necrotic lymph nodes within the right axilla. Suspicious for breast cancer. Recommend further evaluation with mammogram/ultrasound.  Wall thickening/irregularity of the urinary bladder. Recommend further evaluation with aortogram or cystoscopy.  Indeterminant 3 mm right upper lobe nodule. Recommend follow-up chest CT in 3 months.    PROBLEM: Thrombosis within left femoral vein extending into the IVC probably due to metastatic CA.  PLAN:   1. Heparin  Infusion.  Unit(s)/Hr (15 mL/Hr) IV Continuous according to nomogram  2. Heparin   Injectable 6500 Unit(s) IV Push every 6 hours PRN For aPTT less than 40  3. Heparin   Injectable 3000 Unit(s) IV Push every 6 hours PRN For aPTT between 40 - 57    FOLLOW UP / RESULT: F/u aPTT per nomogram.

## 2020-10-11 NOTE — DIETITIAN INITIAL EVALUATION ADULT. - PHYSICAL APPEARANCE
other (specify)/underweight/debilitated/BMI 22 Nutrition focused physical exam conducted:  Subcutaneous fat loss: [Moderate ] Orbital fat pads region, [ ]Buccal fat region, [Moderate ]Triceps region,  [ ]Ribs region.  Muscle wasting: [Moderate ]Temples region, [ ]Clavicle region, [ ]Shoulder region, [ ]Scapula region, [ ]Interosseous region,  [ ]thigh region, [ Moderate]Calf region

## 2020-10-11 NOTE — PROGRESS NOTE ADULT - ASSESSMENT
87 y/o F pt with a significant PMHx of breast cancer(finished radiotherapy in July)  HTN and no significant PSHx presents to the ED with c/o generalized fatigue  Admitting for failure to thrive and metastatic cancer evaluation     Problem/Plan - 1:  ·  Problem: Fatigue, unspecified type.  Plan: Multifactorial . Improving.      Problem/Plan - 2:  ·  Problem: UTI (urinary tract infection).  Plan: - - positive UA , no other signs of infection   - afebrile   - no Leukocytosis  - Lactate normal   - Will start with Rocephin   - f/u blood cultures (specimen received)   - f/u urine cultures (specimen received).      Problem/Plan - 3:  ·  Problem: Metastatic breast carcinoma.  Plan: patient presents with generalized fatigue , weakness, anorexia and weight loss since 1 week  - Also reports significant weight loss in last 1 month  - H/O Breast cancer, stopped radiation therapy in July because as per patient, her oncologist recommended that her cancer has gone  - Pan CT of the body shows hypodense lesion in the liver, bones and irregular bladder wall thickening  - UA also positive , No fever or Wbc   - Calcium 12.3 and Albumin 2.7  - Likely due to metastatic cancer   - Will start with NS at 100ml/hr for 24 hours for initial management of hypercalcemia  - f/u Vit D1-25 , PTHrP,   - f/u with Dr Gold for further recommendations   - outpatient records from the oncologist.      Problem/Plan - 4:  ·  Problem: Hypercalcemia.  Plan: Patient has Calcium 12.3   corrected calcium 13.9  -Most likely due to malignancy  - Will start with IV fluids   -f/u Vitamin D and PTH rP to rule out other causes of malignancy (  - f/u outpaitent records from oncologist for   - Oncologist Dr Gold.      Problem/Plan - 5:  ·  Problem: DVT (deep venous thrombosis).  Plan: Thrombosis within left femoral vein extending into the IVC. No pulmonary embolus.  Will start on Heparin drip once CT head is back, and there is no evidence of bleed in the brain   - Monitor PTT.      Problem/Plan - 6:  Problem: Hypertension. Plan: Patient takes Amlodipine 5mg   Will continue with parameters   Dash diet  f.u A1C, Lipid profile.     Problem/Plan - 7:  ·  Problem: Prophylactic measure.  Plan:   Improve score 4  Heparin drip for DVT.     Over all prognosis poor so will consult palliative.

## 2020-10-11 NOTE — DIETITIAN INITIAL EVALUATION ADULT. - FACTORS AFF FOOD INTAKE
change in sense of smell or taste/other (specify)/weakness/fatigue, metastatic breast ca, diabetes, HTN, DVT, hypercalcemia, s/p chemo tx. in July 2020/difficulty with food procurement/preparation/persistent lack of appetite

## 2020-10-11 NOTE — PROGRESS NOTE ADULT - SUBJECTIVE AND OBJECTIVE BOX
INTERVAL HPI/OVERNIGHT EVENTS: i feel fine.   Vital Signs Last 24 Hrs  T(C): 36.7 (11 Oct 2020 14:30), Max: 37.1 (10 Oct 2020 19:45)  T(F): 98.1 (11 Oct 2020 14:30), Max: 98.8 (10 Oct 2020 19:45)  HR: 74 (11 Oct 2020 14:30) (71 - 82)  BP: 127/80 (11 Oct 2020 14:30) (127/80 - 159/88)  BP(mean): --  RR: 16 (11 Oct 2020 14:30) (16 - 16)  SpO2: 95% (11 Oct 2020 14:30) (95% - 98%)  I&O's Summary    MEDICATIONS  (STANDING):  amLODIPine   Tablet 5 milliGRAM(s) Oral daily  aspirin 325 milliGRAM(s) Oral Once  cefTRIAXone   IVPB      cefTRIAXone   IVPB 1000 milliGRAM(s) IV Intermittent every 24 hours  heparin  Infusion.  Unit(s)/Hr (15 mL/Hr) IV Continuous <Continuous>  influenza   Vaccine 0.5 milliLiter(s) IntraMuscular once  sodium chloride 0.9%. 1000 milliLiter(s) (100 mL/Hr) IV Continuous <Continuous>  sodium chloride 0.9%. 1000 milliLiter(s) (100 mL/Hr) IV Continuous <Continuous>    MEDICATIONS  (PRN):  heparin   Injectable 6500 Unit(s) IV Push every 6 hours PRN For aPTT less than 40  heparin   Injectable 3000 Unit(s) IV Push every 6 hours PRN For aPTT between 40 - 57    LABS:                        11.6   7.00  )-----------( 241      ( 11 Oct 2020 11:08 )             33.8     1011    136  |  104  |  15  ----------------------------<  115<H>  4.1   |  24  |  1.10    Ca    11.2<H>      11 Oct 2020 11:08  Phos  2.0     10-11  Mg     1.6     10-11    TPro  6.5  /  Alb  2.2<L>  /  TBili  1.0  /  DBili  x   /  AST  137<H>  /  ALT  72<H>  /  AlkPhos  158<H>  10-11    PT/INR - ( 10 Oct 2020 22:49 )   PT: 13.3 sec;   INR: 1.12 ratio         PTT - ( 11 Oct 2020 11:08 )  PTT:44.3 sec  Urinalysis Basic - ( 10 Oct 2020 22:28 )    Color: Yellow / Appearance: Slightly Turbid / S.015 / pH: x  Gluc: x / Ketone: Negative  / Bili: Negative / Urobili: Negative   Blood: x / Protein: 15 / Nitrite: Positive   Leuk Esterase: Small / RBC: 2-5 /HPF / WBC 6-10 /HPF   Sq Epi: x / Non Sq Epi: Moderate /HPF / Bacteria: TNTC /HPF      CAPILLARY BLOOD GLUCOSE      POCT Blood Glucose.: 106 mg/dL (11 Oct 2020 11:50)  POCT Blood Glucose.: 106 mg/dL (11 Oct 2020 08:07)        Urinalysis Basic - ( 10 Oct 2020 22:28 )    Color: Yellow / Appearance: Slightly Turbid / S.015 / pH: x  Gluc: x / Ketone: Negative  / Bili: Negative / Urobili: Negative   Blood: x / Protein: 15 / Nitrite: Positive   Leuk Esterase: Small / RBC: 2-5 /HPF / WBC 6-10 /HPF   Sq Epi: x / Non Sq Epi: Moderate /HPF / Bacteria: TNTC /HPF      REVIEW OF SYSTEMS:  CONSTITUTIONAL: No fever, weight loss, or fatigue  EYES: No eye pain, visual disturbances, or discharge  ENMT:  No difficulty hearing, tinnitus, vertigo; No sinus or throat pain  RESPIRATORY: No cough, wheezing, chills or hemoptysis; No shortness of breath  CARDIOVASCULAR: No chest pain, palpitations, dizziness, or leg swelling  GASTROINTESTINAL: No abdominal or epigastric pain. No nausea, vomiting, or hematemesis; No diarrhea or constipation. No melena or hematochezia.  GENITOURINARY: No dysuria, frequency, hematuria, or incontinence  NEUROLOGICAL: No headaches, memory loss, loss of strength, numbness, or tremors      Consultant(s) Notes Reviewed:  [x ] YES  [ ] NO    PHYSICAL EXAM:  GENERAL: NAD, well-groomed, well-developed,not in any distress ,  HEAD:  Atraumatic, Normocephalic  EYES: EOMI, PERRLA, conjunctiva and sclera clear  ENMT: No tonsillar erythema, exudates, or enlargement; Moist mucous membranes, Good dentition, No lesions  NECK: Supple, No JVD, Normal thyroid  NERVOUS SYSTEM:  Alert & Oriented X3, No focal deficit   CHEST/LUNG: Good air entry bilateral with no  rales, rhonchi, wheezing, or rubs  HEART: Regular rate and rhythm; No murmurs, rubs, or gallops  ABDOMEN: Soft, Nontender, Nondistended; Bowel sounds present  EXTREMITIES:  2+ Peripheral Pulses, No clubbing, cyanosis, or edema  SKIN: No rashes or lesions    Care Discussed with Consultants/Other Providers [ x] YES  [ ] NO

## 2020-10-11 NOTE — DIETITIAN INITIAL EVALUATION ADULT. - PROBLEM SELECTOR PLAN 3
patient presents with generalized fatigue , weakness, anorexia and weight loss since 1 week  - Also reports significant weight loss in last 1 month  - H/O Breast cancer, stopped radiation therapy in July because as per patient, her oncologist recommended that her cancer has gone  - Pan CT of the body shows hypodense lesion in the liver, bones and irregular bladder wall thickening  - UA also positive , No fever or Wbc   - Calcium 12.3 and Albumin 2.7  - Likely due to metastatic cancer   - Will start with NS at 100ml/hr for 24 hours for initial management of hypercalcemia  - f/u Vit D1-25 , PTHrP,   - f/u with Dr Gold for further recommendations   - outpatient records from the oncologist

## 2020-10-12 DIAGNOSIS — E88.09 OTHER DISORDERS OF PLASMA-PROTEIN METABOLISM, NOT ELSEWHERE CLASSIFIED: ICD-10-CM

## 2020-10-12 DIAGNOSIS — C79.9 SECONDARY MALIGNANT NEOPLASM OF UNSPECIFIED SITE: ICD-10-CM

## 2020-10-12 LAB
A1C WITH ESTIMATED AVERAGE GLUCOSE RESULT: 6 % — HIGH (ref 4–5.6)
ALBUMIN SERPL ELPH-MCNC: 2.1 G/DL — LOW (ref 3.5–5)
ALP SERPL-CCNC: 161 U/L — HIGH (ref 40–120)
ALT FLD-CCNC: 67 U/L DA — HIGH (ref 10–60)
ANION GAP SERPL CALC-SCNC: 4 MMOL/L — LOW (ref 5–17)
APTT BLD: 146.8 SEC — CRITICAL HIGH (ref 27.5–35.5)
APTT BLD: >200 SEC — CRITICAL HIGH (ref 27.5–35.5)
AST SERPL-CCNC: 137 U/L — HIGH (ref 10–40)
BILIRUB SERPL-MCNC: 1 MG/DL — SIGNIFICANT CHANGE UP (ref 0.2–1.2)
BUN SERPL-MCNC: 10 MG/DL — SIGNIFICANT CHANGE UP (ref 7–18)
CALCIUM SERPL-MCNC: 11.2 MG/DL — HIGH (ref 8.4–10.5)
CHLORIDE SERPL-SCNC: 104 MMOL/L — SIGNIFICANT CHANGE UP (ref 96–108)
CO2 SERPL-SCNC: 28 MMOL/L — SIGNIFICANT CHANGE UP (ref 22–31)
CREAT SERPL-MCNC: 0.99 MG/DL — SIGNIFICANT CHANGE UP (ref 0.5–1.3)
ESTIMATED AVERAGE GLUCOSE: 126 MG/DL — HIGH (ref 68–114)
GLUCOSE BLDC GLUCOMTR-MCNC: 117 MG/DL — HIGH (ref 70–99)
GLUCOSE SERPL-MCNC: 118 MG/DL — HIGH (ref 70–99)
HCT VFR BLD CALC: 37.5 % — SIGNIFICANT CHANGE UP (ref 34.5–45)
HGB BLD-MCNC: 12.4 G/DL — SIGNIFICANT CHANGE UP (ref 11.5–15.5)
MCHC RBC-ENTMCNC: 28.2 PG — SIGNIFICANT CHANGE UP (ref 27–34)
MCHC RBC-ENTMCNC: 33.1 GM/DL — SIGNIFICANT CHANGE UP (ref 32–36)
MCV RBC AUTO: 85.2 FL — SIGNIFICANT CHANGE UP (ref 80–100)
NRBC # BLD: 0 /100 WBCS — SIGNIFICANT CHANGE UP (ref 0–0)
PLATELET # BLD AUTO: 233 K/UL — SIGNIFICANT CHANGE UP (ref 150–400)
POTASSIUM SERPL-MCNC: 4.1 MMOL/L — SIGNIFICANT CHANGE UP (ref 3.5–5.3)
POTASSIUM SERPL-SCNC: 4.1 MMOL/L — SIGNIFICANT CHANGE UP (ref 3.5–5.3)
PROT SERPL-MCNC: 6.3 G/DL — SIGNIFICANT CHANGE UP (ref 6–8.3)
RBC # BLD: 4.4 M/UL — SIGNIFICANT CHANGE UP (ref 3.8–5.2)
RBC # FLD: 15.9 % — HIGH (ref 10.3–14.5)
SODIUM SERPL-SCNC: 136 MMOL/L — SIGNIFICANT CHANGE UP (ref 135–145)
VIT D25+D1,25 OH+D1,25 PNL SERPL-MCNC: 64.7 PG/ML — SIGNIFICANT CHANGE UP (ref 19.9–79.3)
WBC # BLD: 7.41 K/UL — SIGNIFICANT CHANGE UP (ref 3.8–10.5)
WBC # FLD AUTO: 7.41 K/UL — SIGNIFICANT CHANGE UP (ref 3.8–10.5)

## 2020-10-12 RX ORDER — SODIUM CHLORIDE 9 MG/ML
1000 INJECTION INTRAMUSCULAR; INTRAVENOUS; SUBCUTANEOUS
Refills: 0 | Status: DISCONTINUED | OUTPATIENT
Start: 2020-10-12 | End: 2020-10-13

## 2020-10-12 RX ORDER — ENOXAPARIN SODIUM 100 MG/ML
80 INJECTION SUBCUTANEOUS EVERY 12 HOURS
Refills: 0 | Status: DISCONTINUED | OUTPATIENT
Start: 2020-10-12 | End: 2020-10-15

## 2020-10-12 RX ORDER — TRAMADOL HYDROCHLORIDE 50 MG/1
25 TABLET ORAL ONCE
Refills: 0 | Status: DISCONTINUED | OUTPATIENT
Start: 2020-10-12 | End: 2020-10-12

## 2020-10-12 RX ADMIN — Medication 325 MILLIGRAM(S): at 06:54

## 2020-10-12 RX ADMIN — HEPARIN SODIUM 0 UNIT(S)/HR: 5000 INJECTION INTRAVENOUS; SUBCUTANEOUS at 02:28

## 2020-10-12 RX ADMIN — ENOXAPARIN SODIUM 80 MILLIGRAM(S): 100 INJECTION SUBCUTANEOUS at 17:12

## 2020-10-12 RX ADMIN — HEPARIN SODIUM 0 UNIT(S)/HR: 5000 INJECTION INTRAVENOUS; SUBCUTANEOUS at 10:33

## 2020-10-12 RX ADMIN — HEPARIN SODIUM 1100 UNIT(S)/HR: 5000 INJECTION INTRAVENOUS; SUBCUTANEOUS at 03:32

## 2020-10-12 RX ADMIN — AMLODIPINE BESYLATE 5 MILLIGRAM(S): 2.5 TABLET ORAL at 06:17

## 2020-10-12 RX ADMIN — CEFTRIAXONE 100 MILLIGRAM(S): 500 INJECTION, POWDER, FOR SOLUTION INTRAMUSCULAR; INTRAVENOUS at 21:24

## 2020-10-12 RX ADMIN — SODIUM CHLORIDE 100 MILLILITER(S): 9 INJECTION INTRAMUSCULAR; INTRAVENOUS; SUBCUTANEOUS at 11:22

## 2020-10-12 NOTE — PROGRESS NOTE ADULT - ASSESSMENT
HYPERCALCEMIA: This is associate with bony lesions  and hepatic lesions, likely mets PO4 is low so possible  PTH related peptide induced.  However, may also be due to direct bone mets. Mild DALY associated.  - Hydrate with NS at 100 ml/hour.  - Follow up BMP.  - No Furosemide.  - PTH and PTHrP levels.  - If no response, Biphosphonate use as per Oncology.     Hypophosphatemia  Monitor serum PO4  replete as needed

## 2020-10-12 NOTE — PROGRESS NOTE ADULT - PROBLEM SELECTOR PLAN 1
patient presents with generalized fatigue , weakness, anorexia and weight loss since 1 week  - Also reports significant weight loss in last 1 month  - H/O Breast cancer, stopped radiation therapy in July because as per patient, her oncologist recommended that her cancer has gone  - Pan CT of the body shows hypodense lesion in the liver, bones and irregular bladder wall thickening  - UA also positive , No fever or Wbc   - Calcium 12.3 and Albumin 2.7  - Likely due to metastatic cancer   - Will start with NS at 100ml/hr for 24 hours for initial management of hypercalcemia  - f/u Vit D1-25 , PTHrP,   - f/u with Dr Gold for further recommendations Pt. with Hx breast Ca 15yrs ago, now with recurrence at right axilla, bones and liver with hypercalcemia  -CT A/P/C findings as above  -Per hem/onc pt. will need rebiopsy, will need family agreement.

## 2020-10-12 NOTE — PROGRESS NOTE ADULT - PROBLEM SELECTOR PLAN 4
Patient has Calcium 12.3   corrected calcium 13.9  -Most likely due to malignancy  - Will start with IV fluids   -f/u Vitamin D and PTH rP to rule out other causes of malignancy (  - f/u outpaitent records from oncologist for   - Oncologist Dr Gold - patient presents with generalized weakness  - positive UA , no other signs of infection   - afebrile   - no Leukocytosis  - Lactate normal   - Will start with Rocephin   - f/u blood cultures (specimen received)   - f/u urine cultures (specimen received) Fatigue likely due to met disease superimposed with UTI  - UA +, urine Cx testing  -Blood Cx NTD  -Hypercalcemia slowly improving, may need bisphosphonate

## 2020-10-12 NOTE — PROGRESS NOTE ADULT - ASSESSMENT
89 y/o F pt with a significant PMHx of breast cancer(finished radiotherapy in July)  HTN , questionable diabetes and no significant PSHx presents to the ED with c/o generalized fatigue. Patient states extremely tired, decreased appetite, unable to walk, since 1 week associated with significant weight loss since a month. Pt states living with her son, had labs done yesterday which showed that her calcium and liver enzyme levels are elevated.   Patient denies any syncope, fever, chills, chest pain, shortness of breath, abdominal pain, paresthesia, numbness or any other complains.  CT A/P/C suspi 89 y/o F pt with a significant PMHx of breast cancer(finished radiotherapy in July)  HTN , questionable diabetes and no significant PSHx presents to the ED with c/o generalized fatigue. Patient states extremely tired, decreased appetite, unable to walk, since 1 week associated with significant weight loss since a month. Pt states living with her son, had labs done yesterday which showed that her calcium and liver enzyme levels are elevated.   Patient denies any syncope, fever, chills, chest pain, shortness of breath, abdominal pain, paresthesia, numbness or any other complains.  Pt. with CT A/P/C suspicious for metastatic Ca associated with hypercalcemia (Ca 12.3) and increased generalized weakness.  Pt. also with Thrombosis within left femoral vein extending into the IVC. No pulmonary embolus.  Pt. admitted to medicine, followed by nephro and hem/onc, was started on Heparin gtt. which was transitioned to full dose Lovenox.     87 y/o F pt with a significant PMHx of breast cancer(finished radiotherapy in July)  HTN , questionable diabetes and no significant PSHx presents to the ED with c/o generalized fatigue. Patient states extremely tired, decreased appetite, unable to walk, since 1 week associated with significant weight loss since a month. Pt states living with her son, had labs done yesterday which showed that her calcium and liver enzyme levels are elevated.   Patient denies any syncope, fever, chills, chest pain, shortness of breath, abdominal pain, paresthesia, numbness or any other complains.  Pt. with CT A/P/C suspicious for metastatic Ca associated with hypercalcemia (Ca 12.3) and increased generalized weakness.  Pt. also with Thrombosis within left femoral vein extending into the IVC. No pulmonary embolus.  Pt. admitted to medicine, followed by nephro and hem/onc, was started on Heparin gtt. which was transitioned to full dose Lovenox.  Pt. was also noted with UTI, treated with IV Rocephin.  Pt. seen and examined, continues to c/o generalized weakness, denies pain.  Pt. is HD stable, afebrile with no leukocytosis.  Her Ca is slowly improving, 11.2 today.

## 2020-10-12 NOTE — PROGRESS NOTE ADULT - SUBJECTIVE AND OBJECTIVE BOX
NP Note discussed with  Primary Attending    Patient is a 88y old  Female who presents with a chief complaint of Generalized weakness (12 Oct 2020 10:29)      INTERVAL HPI/OVERNIGHT EVENTS: no new complaints    MEDICATIONS  (STANDING):  amLODIPine   Tablet 5 milliGRAM(s) Oral daily  cefTRIAXone   IVPB      cefTRIAXone   IVPB 1000 milliGRAM(s) IV Intermittent every 24 hours  enoxaparin Injectable 80 milliGRAM(s) SubCutaneous every 12 hours  influenza   Vaccine 0.5 milliLiter(s) IntraMuscular once  sodium chloride 0.9%. 1000 milliLiter(s) (100 mL/Hr) IV Continuous <Continuous>  sodium chloride 0.9%. 1000 milliLiter(s) (100 mL/Hr) IV Continuous <Continuous>  sodium chloride 0.9%. 1000 milliLiter(s) (100 mL/Hr) IV Continuous <Continuous>    MEDICATIONS  (PRN):      __________________________________________________  REVIEW OF SYSTEMS: comfortable, + weakness    CONSTITUTIONAL: No fever,   EYES: no acute visual disturbances  NECK: No pain or stiffness  RESPIRATORY: No cough; No shortness of breath  CARDIOVASCULAR: No chest pain, no palpitations  GASTROINTESTINAL: No pain. No nausea or vomiting; No diarrhea   NEUROLOGICAL: No headache or numbness, no tremors  MUSCULOSKELETAL: No joint pain, no muscle pain  GENITOURINARY: no dysuria, no frequency, no hesitancy  PSYCHIATRY: no depression , no anxiety  ALL OTHER  ROS negative        Vital Signs Last 24 Hrs  T(C): 36.8 (12 Oct 2020 06:00), Max: 36.8 (12 Oct 2020 06:00)  T(F): 98.3 (12 Oct 2020 06:00), Max: 98.3 (12 Oct 2020 06:00)  HR: 69 (12 Oct 2020 06:00) (69 - 76)  BP: 154/89 (12 Oct 2020 06:00) (127/80 - 154/89)  BP(mean): --  RR: 16 (12 Oct 2020 06:00) (16 - 16)  SpO2: 95% (12 Oct 2020 06:00) (95% - 99%)    ________________________________________________  PHYSICAL EXAM:  well developed, well groomed  GENERAL: NAD  HEENT: Normocephalic;  conjunctivae and sclerae clear; moist mucous membranes;   NECK : supple  CHEST/LUNG: Clear to auscultation bilaterally with good air entry   HEART: S1 S2  regular; no murmurs, gallops or rubs  ABDOMEN: Soft, Nontender, Nondistended; Bowel sounds present  EXTREMITIES: no cyanosis; no edema; no calf tenderness  SKIN: warm and dry; no rash  NERVOUS SYSTEM:  Awake and alert; Oriented  to place, person and time ; no new deficits    _________________________________________________  LABS:                        12.4   7.41  )-----------( 233      ( 12 Oct 2020 09:59 )             37.5     10-12    136  |  104  |  10  ----------------------------<  118<H>  4.1   |  28  |  0.99    Ca    11.2<H>      12 Oct 2020 09:59  Phos  2.0     10-11  Mg     1.6     10-11    TPro  6.3  /  Alb  2.1<L>  /  TBili  1.0  /  DBili  x   /  AST  137<H>  /  ALT  67<H>  /  AlkPhos  161<H>  10-12    PT/INR - ( 10 Oct 2020 22:49 )   PT: 13.3 sec;   INR: 1.12 ratio         PTT - ( 12 Oct 2020 09:59 )  PTT:146.8 sec  Urinalysis Basic - ( 10 Oct 2020 22:28 )    Color: Yellow / Appearance: Slightly Turbid / S.015 / pH: x  Gluc: x / Ketone: Negative  / Bili: Negative / Urobili: Negative   Blood: x / Protein: 15 / Nitrite: Positive   Leuk Esterase: Small / RBC: 2-5 /HPF / WBC 6-10 /HPF   Sq Epi: x / Non Sq Epi: Moderate /HPF / Bacteria: TNTC /HPF      CAPILLARY BLOOD GLUCOSE      POCT Blood Glucose.: 104 mg/dL (11 Oct 2020 21:04)  POCT Blood Glucose.: 130 mg/dL (11 Oct 2020 17:08)        RADIOLOGY & ADDITIONAL TESTS:      Xray Chest 2 Views PA/Lat (10.10.20 @ 16:22) >  EXAM:  XR CHEST PA LAT 2V                            PROCEDURE DATE:  10/10/2020          INTERPRETATION:  Exam Date: 10/10/2020 4:22 PM    History: Weakness, fatigue    Technique: Frontal and lateral views of the chest with comparison to  2020    Findings:    The heart is enlarged.  The lungs are grossly clear. The apices and hemidiaphragms are unremarkable. Degenerative changes of the visualized osseous structures.    Impression:    No acute disease    No significant interval change as compared to  2020    < end of copied text >        CT Chest w/ IV Cont (10.10.20 @ 19:05) >  EXAM:  CT ABDOMEN AND PELVIS IC                          EXAM:  CT CHEST IC                            PROCEDURE DATE:  10/10/2020          INTERPRETATION:  CLINICAL INFORMATION: Hypercalcemia, fatigue, evaluate for metastases    COMPARISON: None.    PROCEDURE:  CT of the Chest, Abdomen and Pelvis was performed with intravenous contrast.  Intravenous contrast: 90 ml Omnipaque 350. 10 ml discarded.  Oral contrast: None.  Sagittal and coronal reformats were performed.    FINDINGS:    CHEST:    LUNGS AND LARGE AIRWAYS: Patent central airways. Indeterminant 3 mm right upper lobe nodule (series 2 image 46). Small calcified granuloma within the right upper lobe. Mild subpleural scarring/linear atelectasis within the right lower lobe.    PLEURA: No pleural effusion.    VESSELS: No pulmonary embolus. No aortic aneurysm or dissection.    HEART: Heart size is normal. No pericardial effusion. Mild coronary calcifications.    MEDIASTINUM AND CATALINO: Borderline mediastinal lymph nodes includin.5 cm short axis left lower part tracheal lymph node. 1.3 cm short axis subcarinal lymph node.    CHEST WALL AND LOWER NECK: Tiny nodules within the thyroid gland. Right breast skin thickening and irregular asymmetric subareolar density. 2.7 x 1.4 cm necrotic lymph nodes within the right axilla.    ABDOMEN AND PELVIS:    LIVER: Small hepatic cysts. Multiple hypodense lesions measuring up to 2 cm within the liver parenchyma likely metastatic disease. Nodular contour of the liver.  BILE DUCTS: Mild biliary ductal dilatation.  GALLBLADDER: Not visualized.  SPLEEN: Within normal limits.  PANCREAS: Within normal limits.  ADRENALS: Within normal limits.  KIDNEYS/URETERS: Within normal limits.    BLADDER: Wall thickening/irregularity  REPRODUCTIVE ORGANS: Hysterectomy.    BOWEL: No bowel obstruction. Appendix not visualized.  PERITONEUM: Trace fluid within the cul-de-sac.  VESSELS: Thrombosis within left femoral vein extending into the IVC. The abdominal aorta and its branches are patent.  RETROPERITONEUM/LYMPH NODES: No lymphadenopathy.  ABDOMINAL WALL: Within normal limits.  BONES: Hypodense lesions with cortical destruction within the left 2nd and 3rd ribs likely metastatic. Lucent bone lesions within the bilateral iliac and femoral bones are indeterminant.    IMPRESSION:    Thrombosis within left femoral vein extending into the IVC. No pulmonary embolus.    Multiple hypodense lesions measuring up to 2 cm within the liver parenchyma likely metastatic disease.    Hypodense lesions within the left 2nd and 3rd ribs with cortical destruction likely metastatic. Lucent bone lesions within the bilateral iliac and femoral bones are indeterminant. Further evaluation can be performed with PET-CT or bone scan.    Right breast skin thickening and irregular asymmetric subareolar density. 2.7 x 1.4 cm necrotic lymph nodes within the right axilla. Suspicious for breast cancer. Recommend further evaluation with mammogram/ultrasound.    Wall thickening/irregularity of the urinary bladder. Recommendfurther evaluation with aortogram or cystoscopy.    Indeterminant 3 mm right upper lobe nodule. Recommend follow-up chest CT in 3 months.      These findings were discussed with Dr. FLORES, on 10/10/2020 7:43 PM with read back.    < end of copied text >        CT Head No Cont (10.11.20 @ 03:59) >  EXAM:  CT BRAIN                            PROCEDURE DATE:  10/11/2020          INTERPRETATION:  CT HEAD WITHOUT CONTRAST    INDICATION: 88 years old. Female. metastatic disease H/o breast cancer. Weakness    COMPARISON: None available.    TECHNIQUE: Noncontrast axial CT head was obtained from the skull base to vertex.    FINDINGS:  No acute intracranial hemorrhage, mass effect or midline shift.  No CT evidence of acute large territory vascular infarct.  The ventricles and cortical sulci are within normal limits for age.  Patchy hypodensities in the periventricular white matter are nonspecific, but likely sequela of small vessel ischemic disease.    Small mucus retention cyst/polyp in the left maxillary. The mastoid air cells are well aerated.  No displaced calvarial fracture.    IMPRESSION:  No acute intracranial hemorrhage or mass effect. Further evaluation with MRI may be performed as clinically indicated.    < end of copied text >  Imaging Personally Reviewed:  YES    Consultant(s) Notes Reviewed:   YES    Care Discussed with Consultants :     Plan of care was discussed with patient and /or primary care giver; all questions and concerns were addressed and care was aligned with patient's wishes.

## 2020-10-12 NOTE — PROGRESS NOTE ADULT - SUBJECTIVE AND OBJECTIVE BOX
[facility] [place] [facility]   MD MARCELLO MCBRIDE MD RUORU WONG, PA    TEL:  OFFICE: [phone]  DR SANCHEZ CELL: [phone]  FRANCISCO JAVIER MILLER CELL: [phone]  DR. TORRES CELL: [phone]  DR. CHÁVEZ CELL: [phone]    FROM 5 PM - 7 AM PLEASE CALL ANSWERING SERVICE: [phone]    RENAL FOLLOW UP NOTE--Date of Service 10-12-20 @ 10:29  --------------------------------------------------------------------------------  HPI:      Pt seen and examined at bedside.       PAST HISTORY  --------------------------------------------------------------------------------  No significant changes to PMH, PSH, FHx, SHx, unless otherwise noted    ALLERGIES & MEDICATIONS  --------------------------------------------------------------------------------  Allergies    Benadryl (Swelling)    Intolerances      Standing Inpatient Medications  amLODIPine   Tablet 5 milliGRAM(s) Oral daily  cefTRIAXone   IVPB      cefTRIAXone   IVPB 1000 milliGRAM(s) IV Intermittent every 24 hours  heparin  Infusion.  Unit(s)/Hr IV Continuous <Continuous>  influenza   Vaccine 0.5 milliLiter(s) IntraMuscular once  sodium chloride 0.9%. 1000 milliLiter(s) IV Continuous <Continuous>  sodium chloride 0.9%. 1000 milliLiter(s) IV Continuous <Continuous>    PRN Inpatient Medications  heparin   Injectable 6500 Unit(s) IV Push every 6 hours PRN  heparin   Injectable 3000 Unit(s) IV Push every 6 hours PRN      REVIEW OF SYSTEMS  --------------------------------------------------------------------------------  General: no fever    MSK: no edema     VITALS/PHYSICAL EXAM  --------------------------------------------------------------------------------  T(C): 36.8 (10-12-20 @ 06:00), Max: 36.8 (10-12-20 @ 06:00)  HR: 69 (10-12-20 @ 06:00) (69 - 76)  BP: 154/89 (10-12-20 @ 06:00) (127/80 - 154/89)  RR: 16 (10-12-20 @ 06:00) (16 - 16)  SpO2: 95% (10-12-20 @ 06:00) (95% - 99%)  Wt(kg): --  Height (cm): 188 (10-10-20 @ 15:36)  Weight (kg): 83.4 (10-11-20 @ 04:09)  BMI (kg/m2): 23.6 (10-11-20 @ 04:09)  BSA (m2): 2.1 (10-11-20 @ 04:09)      Physical Exam:  	Gen: NAD  	HEENT: MMM  	Pulm: CTA B/L  	CV: S1S2  	Abd: Soft, +BS  	Ext: No LE edema B/L                      Neuro: Awake   	Skin: Warm and Dry   	Vascular access: no hd catheter           no  george  LABS/STUDIES  --------------------------------------------------------------------------------              12.4   7.41  >-----------<  233      [10-12-20 @ 09:59]              37.5     136  |  104  |  10  ----------------------------<  118      [10-12-20 @ 09:59]  4.1   |  28  |  x         Ca     11.2     [10-12-20 @ 09:59]      Mg     1.6     [10-11-20 @ 11:08]      Phos  2.0     [10-11-20 @ 11:08]    TPro  x   /  Alb  2.1  /  TBili  x   /  DBili  x   /  AST  x   /  ALT  x   /  AlkPhos  x   [10-12-20 @ 09:59]    PT/INR: PT 13.3 , INR 1.12       [10-10-20 @ 22:49]  PTT: 146.8      [10-12-20 @ 09:59]    Troponin 0.037      [10-10-20 @ 17:20]    Creatinine Trend:  SCr 1.10 [10-11 @ 11:08]  SCr 1.28 [10-10 @ 17:20]    Urinalysis - [10-10-20 @ 22:28]      Color Yellow / Appearance Slightly Turbid / SG 1.015 / pH 5.0      Gluc Negative / Ketone Negative  / Bili Negative / Urobili Negative       Blood Trace / Protein 15 / Leuk Est Small / Nitrite Positive      RBC 2-5 / WBC 6-10 / Hyaline  / Gran  / Sq Epi  / Non Sq Epi Moderate / Bacteria TNTC      Iron 46, TIBC 191, %sat 24      [10-11-20 @ 11:08]  Ferritin 796      [10-11-20 @ 15:34]  Vitamin D (25OH) 40.8      [10-11-20 @ 15:34]  TSH 0.50      [10-11-20 @ 11:08]  Lipid: chol 94, TG 50, HDL 53, LDL 31      [10-11-20 @ 11:08]

## 2020-10-12 NOTE — PROGRESS NOTE ADULT - PROBLEM SELECTOR PLAN 2
- patient presents with generalized weakness  - positive UA , no other signs of infection   - afebrile   - no Leukocytosis  - Lactate normal   - Will start with Rocephin   - f/u blood cultures (specimen received)   - f/u urine cultures (specimen received) Hypercalcemia due to bone mets-slowly improving  -Admitted with Calcium 12.3->11.2  -Nephro Dr. cohen following  -Cont IVF hydration NS@100ml/hr  -If no response start Zometa per hem/onc  -f/u PTH and PTHrP levels  -f/u BMP

## 2020-10-12 NOTE — PROGRESS NOTE ADULT - PROBLEM SELECTOR PLAN 6
Patient takes Amlodipine 5mg   Will continue with parameters   Dash diet  f.u A1C, Lipid profile Thrombosis within left femoral vein extending into the IVC. No pulmonary embolus.  Will start on Heparin drip once CT head is back, and there is no evidence of bleed in the brain   - Monitor PTT CT A/P shows Thrombosis within left femoral vein extending into the IVC. No pulmonary embolus.  -Was started on Heparin gtt, now switched to full dose Lovenox

## 2020-10-12 NOTE — CHART NOTE - NSCHARTNOTEFT_GEN_A_CORE
EVENT: Supratherapeutic APTT: Notified by RN of critical value aPTT >200 while on Heparin gtt for DVT. Nomogram being followed with drip being held and will be restarted at appropriate rate as per nomogram. Will monitor for signs of bleeding.    OBJECTIVE:  Vital Signs Last 24 Hrs  T(C): 36.7 (11 Oct 2020 19:38), Max: 36.7 (11 Oct 2020 14:30)  T(F): 98 (11 Oct 2020 19:38), Max: 98.1 (11 Oct 2020 14:30)  HR: 76 (11 Oct 2020 19:38) (74 - 82)  BP: 135/88 (11 Oct 2020 19:38) (127/80 - 156/90)  BP(mean): --  RR: 16 (11 Oct 2020 19:38) (16 - 16)  SpO2: 99% (11 Oct 2020 19:38) (95% - 99%)      LABS:  aActivated Partial Thromboplastin Time: >200.0: TYPE:(C=Critical, N=Notification, A=Abnormal) C   TESTS: _PTT   DATE/TIME CALLED: _10/12/20 02:24   CALLED TO: _DONTAE Matos   READ BACK (2 Patient Identifiers)(Y/N): _Y   READ BACK VALUES (Y/N): _Y   CALLED BY: YASIR estrella (10.12.20 @ 01:54)       Historical Values  Activated Partial Thromboplastin Time: >200.0: TYPE:(C=Critical, N=Notification, A=Abnormal) C   TESTS: _PTT   DATE/TIME CALLED: _10/12/20 02:24                         11.6   7.00  )-----------( 241      ( 11 Oct 2020 11:08 )             33.8   CARDIAC MARKERS ( 10 Oct 2020 17:20 )  0.037 ng/mL / x     / x     / x     / x        10-11    136  |  104  |  15  ----------------------------<  115<H>  4.1   |  24  |  1.10    Ca    11.2<H>      11 Oct 2020 11:08  Phos  2.0     10-11  Mg     1.6     10-11    TPro  6.5  /  Alb  2.2<L>  /  TBili  1.0  /  DBili  x   /  AST  137<H>  /  ALT  72<H>  /  AlkPhos  158<H>  10-11      IMAGING:  < from: CT Abdomen and Pelvis w/ IV Cont (10.10.20 @ 19:05) >      EXAM:  CT ABDOMEN AND PELVIS IC                          EXAM:  CT CHEST IC                            PROCEDURE DATE:  10/10/2020          INTERPRETATION:  CLINICAL INFORMATION: Hypercalcemia, fatigue, evaluate for metastases    COMPARISON: None.    PROCEDURE:  CT of the Chest, Abdomen and Pelvis was performed with intravenous contrast.  Intravenous contrast: 90 ml Omnipaque 350. 10 ml discarded.  Oral contrast: None.  Sagittal and coronal reformats were performed.    FINDINGS:    CHEST:    LUNGS AND LARGE AIRWAYS: Patent central airways. Indeterminant 3 mm right upper lobe nodule (series 2 image 46). Small calcified granuloma within the right upper lobe. Mild subpleural scarring/linear atelectasis within the right lower lobe.    PLEURA: No pleural effusion.    VESSELS: No pulmonary embolus. No aortic aneurysm or dissection.    HEART: Heart size is normal. No pericardial effusion. Mild coronary calcifications.    MEDIASTINUM AND CATALINO: Borderline mediastinal lymph nodes includin.5 cm short axis left lower part tracheal lymph node. 1.3 cm short axis subcarinal lymph node.    CHEST WALL AND LOWER NECK: Tiny nodules within the thyroid gland. Right breast skin thickening and irregular asymmetric subareolar density. 2.7 x 1.4 cm necrotic lymph nodes within the right axilla.    ABDOMEN AND PELVIS:    LIVER: Small hepatic cysts. Multiple hypodense lesions measuring up to 2 cm within the liver parenchyma likely metastatic disease. Nodular contour of the liver.  BILE DUCTS: Mild biliary ductal dilatation.  GALLBLADDER: Not visualized.  SPLEEN: Within normal limits.  PANCREAS: Within normal limits.  ADRENALS: Within normal limits.  KIDNEYS/URETERS: Within normal limits.    BLADDER: Wall thickening/irregularity  REPRODUCTIVE ORGANS: Hysterectomy.    BOWEL: No bowel obstruction. Appendix not visualized.  PERITONEUM: Trace fluid within the cul-de-sac.  VESSELS: Thrombosis within left femoral vein extending into the IVC. The abdominal aorta and its branches are patent.  RETROPERITONEUM/LYMPH NODES: No lymphadenopathy.  ABDOMINAL WALL: Within normal limits.  BONES: Hypodense lesions with cortical destruction within the left 2nd and 3rd ribs likely metastatic. Lucent bone lesions within the bilateral iliac and femoral bones are indeterminant.    IMPRESSION:    Thrombosis within left femoral vein extending into the IVC. No pulmonary embolus.    Multiple hypodense lesions measuring up to 2 cm within the liver parenchyma likely metastatic disease.    Hypodense lesions within the left 2nd and 3rd ribs with cortical destruction likely metastatic. Lucent bone lesions within the bilateral iliac and femoral bones are indeterminant. Further evaluation can be performed with PET-CT or bone scan.    Right breast skin thickening and irregular asymmetric subareolar density. 2.7 x 1.4 cm necrotic lymph nodes within the right axilla. Suspicious for breast cancer. Recommend further evaluation with mammogram/ultrasound.    Wall thickening/irregularity of the urinary bladder. Recommend further evaluation with aortogram or cystoscopy.    Indeterminant 3 mm right upper lobe nodule. Recommend follow-up chest CT in 3 months.      These findings were discussed with Dr. FLORES, on 10/10/2020 7:43 PM with read back.    WEN DAVIDSON,ATTENDING RADIOLOGIST  This document has been electronically signed. Oct 10 2020  7:44PM    t< from: CT Head No Cont (10.11.20 @ 03:59) >      EXAM:  CT BRAIN                            PROCEDURE DATE:  10/11/2020          INTERPRETATION:  CT HEAD WITHOUT CONTRAST    INDICATION: 88 years old. Female. metastatic disease H/o breast cancer. Weakness    COMPARISON: None available.    TECHNIQUE: Noncontrast axial CT head was obtained from the skull base to vertex.    FINDINGS:  No acute intracranial hemorrhage, mass effect or midline shift.  No CT evidence of acute large territory vascular infarct.  The ventricles and cortical sulci are within normal limits for age.  Patchy hypodensities in the periventricular white matter are nonspecific, but likely sequela of small vessel ischemic disease.    Small mucus retention cyst/polyp in the left maxillary. The mastoid air cells are well aerated.  No displaced calvarial fracture.    IMPRESSION:  No acute intracranial hemorrhage or mass effect. Further evaluation with MRI may be performed as clinically indicated.      GIULIANA HOLMAN M.D., ATTENDING RADIOLOGIST  This document has been electronically signed. Oct 11 2020  4:20AM        ASSESSMENT:  HPI:  89 y/o F pt with a significant PMHx of breast cancer(finished radiotherapy in July)  HTN , questionable diabetes and no significant PSHx presents to the ED with c/o generalized fatigue. Patient states extremely tired, decreased appetite, unable to walk, since 1 week associated with significant weight loss since a month. Pt states living with her son, had labs done yesterday which showed that her calcium and liver enzyme levels are elevated.   Patient denies any syncope, fever, chills, chest pain, shortness of breath, abdominal pain, paresthesia, numbness or any other complains.    ED Course:   CT abdomen and Chest with IV contrast shows metastatic lesions in liver, lungs and osteolytic lesions in spine, Clots in femoral vein extending upto IVC  Vital Signs Last 24 Hrs  T(C): 37.1 (10 Oct 2020 19:45), Max: 37.1 (10 Oct 2020 19:45)  T(F): 98.8 (10 Oct 2020 19:45), Max: 98.8 (10 Oct 2020 19:45)  HR: 82 (10 Oct 2020 19:45) (78 - 82)  BP: 131/81 (10 Oct 2020 19:45) (122/84 - 131/81)  RR: 16 (10 Oct 2020 19:45) (16 - 20)  SpO2: 97% (10 Oct 2020 19:45) (97% - 97%) (10 Oct 2020 20:00)      PLAN: Supratherapeutic aPTT 2/2 heparin drip  -Heparin drip held  -Reinstate as per nomogram  - Monitor for signs of bleeding  - Repeat aPTT as per nomogram    FOLLOW UP / RESULT: EVENT: Supratherapeutic APTT: Notified by RN of critical value aPTT >200 while on Heparin gtt for DVT. Nomogram being followed with drip being held for 1 hour and will be restarted at appropriate rate as per nomogram. Will monitor for signs of bleeding.    OBJECTIVE:  Vital Signs Last 24 Hrs  T(C): 36.7 (11 Oct 2020 19:38), Max: 36.7 (11 Oct 2020 14:30)  T(F): 98 (11 Oct 2020 19:38), Max: 98.1 (11 Oct 2020 14:30)  HR: 76 (11 Oct 2020 19:38) (74 - 82)  BP: 135/88 (11 Oct 2020 19:38) (127/80 - 156/90)  BP(mean): --  RR: 16 (11 Oct 2020 19:38) (16 - 16)  SpO2: 99% (11 Oct 2020 19:38) (95% - 99%)      LABS:  aActivated Partial Thromboplastin Time: >200.0: TYPE:(C=Critical, N=Notification, A=Abnormal) C   TESTS: _PTT   DATE/TIME CALLED: _10/12/20 02:24   CALLED TO: _DONTAE Matos   READ BACK (2 Patient Identifiers)(Y/N): _Y   READ BACK VALUES (Y/N): _Y   CALLED BY: YASIR estrella (10.12.20 @ 01:54)       Historical Values  Activated Partial Thromboplastin Time: >200.0: TYPE:(C=Critical, N=Notification, A=Abnormal) C   TESTS: _PTT   DATE/TIME CALLED: _10/12/20 02:24                         11.6   7.00  )-----------( 241      ( 11 Oct 2020 11:08 )             33.8   CARDIAC MARKERS ( 10 Oct 2020 17:20 )  0.037 ng/mL / x     / x     / x     / x        10-11    136  |  104  |  15  ----------------------------<  115<H>  4.1   |  24  |  1.10    Ca    11.2<H>      11 Oct 2020 11:08  Phos  2.0     10-11  Mg     1.6     10-11    TPro  6.5  /  Alb  2.2<L>  /  TBili  1.0  /  DBili  x   /  AST  137<H>  /  ALT  72<H>  /  AlkPhos  158<H>  10-11      IMAGING:  < from: CT Abdomen and Pelvis w/ IV Cont (10.10.20 @ 19:05) >      EXAM:  CT ABDOMEN AND PELVIS IC                          EXAM:  CT CHEST IC                            PROCEDURE DATE:  10/10/2020          INTERPRETATION:  CLINICAL INFORMATION: Hypercalcemia, fatigue, evaluate for metastases    COMPARISON: None.    PROCEDURE:  CT of the Chest, Abdomen and Pelvis was performed with intravenous contrast.  Intravenous contrast: 90 ml Omnipaque 350. 10 ml discarded.  Oral contrast: None.  Sagittal and coronal reformats were performed.    FINDINGS:    CHEST:    LUNGS AND LARGE AIRWAYS: Patent central airways. Indeterminant 3 mm right upper lobe nodule (series 2 image 46). Small calcified granuloma within the right upper lobe. Mild subpleural scarring/linear atelectasis within the right lower lobe.    PLEURA: No pleural effusion.    VESSELS: No pulmonary embolus. No aortic aneurysm or dissection.    HEART: Heart size is normal. No pericardial effusion. Mild coronary calcifications.    MEDIASTINUM AND CATALINO: Borderline mediastinal lymph nodes includin.5 cm short axis left lower part tracheal lymph node. 1.3 cm short axis subcarinal lymph node.    CHEST WALL AND LOWER NECK: Tiny nodules within the thyroid gland. Right breast skin thickening and irregular asymmetric subareolar density. 2.7 x 1.4 cm necrotic lymph nodes within the right axilla.    ABDOMEN AND PELVIS:    LIVER: Small hepatic cysts. Multiple hypodense lesions measuring up to 2 cm within the liver parenchyma likely metastatic disease. Nodular contour of the liver.  BILE DUCTS: Mild biliary ductal dilatation.  GALLBLADDER: Not visualized.  SPLEEN: Within normal limits.  PANCREAS: Within normal limits.  ADRENALS: Within normal limits.  KIDNEYS/URETERS: Within normal limits.    BLADDER: Wall thickening/irregularity  REPRODUCTIVE ORGANS: Hysterectomy.    BOWEL: No bowel obstruction. Appendix not visualized.  PERITONEUM: Trace fluid within the cul-de-sac.  VESSELS: Thrombosis within left femoral vein extending into the IVC. The abdominal aorta and its branches are patent.  RETROPERITONEUM/LYMPH NODES: No lymphadenopathy.  ABDOMINAL WALL: Within normal limits.  BONES: Hypodense lesions with cortical destruction within the left 2nd and 3rd ribs likely metastatic. Lucent bone lesions within the bilateral iliac and femoral bones are indeterminant.    IMPRESSION:    Thrombosis within left femoral vein extending into the IVC. No pulmonary embolus.    Multiple hypodense lesions measuring up to 2 cm within the liver parenchyma likely metastatic disease.    Hypodense lesions within the left 2nd and 3rd ribs with cortical destruction likely metastatic. Lucent bone lesions within the bilateral iliac and femoral bones are indeterminant. Further evaluation can be performed with PET-CT or bone scan.    Right breast skin thickening and irregular asymmetric subareolar density. 2.7 x 1.4 cm necrotic lymph nodes within the right axilla. Suspicious for breast cancer. Recommend further evaluation with mammogram/ultrasound.    Wall thickening/irregularity of the urinary bladder. Recommend further evaluation with aortogram or cystoscopy.    Indeterminant 3 mm right upper lobe nodule. Recommend follow-up chest CT in 3 months.      These findings were discussed with Dr. FLORES, on 10/10/2020 7:43 PM with read back.    WEN DAVIDSON,ATTENDING RADIOLOGIST  This document has been electronically signed. Oct 10 2020  7:44PM    t< from: CT Head No Cont (10.11.20 @ 03:59) >      EXAM:  CT BRAIN                            PROCEDURE DATE:  10/11/2020          INTERPRETATION:  CT HEAD WITHOUT CONTRAST    INDICATION: 88 years old. Female. metastatic disease H/o breast cancer. Weakness    COMPARISON: None available.    TECHNIQUE: Noncontrast axial CT head was obtained from the skull base to vertex.    FINDINGS:  No acute intracranial hemorrhage, mass effect or midline shift.  No CT evidence of acute large territory vascular infarct.  The ventricles and cortical sulci are within normal limits for age.  Patchy hypodensities in the periventricular white matter are nonspecific, but likely sequela of small vessel ischemic disease.    Small mucus retention cyst/polyp in the left maxillary. The mastoid air cells are well aerated.  No displaced calvarial fracture.    IMPRESSION:  No acute intracranial hemorrhage or mass effect. Further evaluation with MRI may be performed as clinically indicated.      GIULIANA HOLMAN M.D., ATTENDING RADIOLOGIST  This document has been electronically signed. Oct 11 2020  4:20AM        ASSESSMENT:  HPI:  89 y/o F pt with a significant PMHx of breast cancer(finished radiotherapy in July)  HTN , questionable diabetes and no significant PSHx presents to the ED with c/o generalized fatigue. Patient states extremely tired, decreased appetite, unable to walk, since 1 week associated with significant weight loss since a month. Pt states living with her son, had labs done yesterday which showed that her calcium and liver enzyme levels are elevated.   Patient denies any syncope, fever, chills, chest pain, shortness of breath, abdominal pain, paresthesia, numbness or any other complains.    ED Course:   CT abdomen and Chest with IV contrast shows metastatic lesions in liver, lungs and osteolytic lesions in spine, Clots in femoral vein extending upto IVC  Vital Signs Last 24 Hrs  T(C): 37.1 (10 Oct 2020 19:45), Max: 37.1 (10 Oct 2020 19:45)  T(F): 98.8 (10 Oct 2020 19:45), Max: 98.8 (10 Oct 2020 19:45)  HR: 82 (10 Oct 2020 19:45) (78 - 82)  BP: 131/81 (10 Oct 2020 19:45) (122/84 - 131/81)  RR: 16 (10 Oct 2020 19:45) (16 - 20)  SpO2: 97% (10 Oct 2020 19:45) (97% - 97%) (10 Oct 2020 20:00)      PLAN: Supratherapeutic aPTT 2/2 heparin drip and bolus  -Heparin drip held for 1 hour  -Drip to be reinstated at 1100 units/hr (300 UNITS less as per nomogram)  - Monitor for signs of bleeding  - Repeat aPTT q6 as per nomogram  -Consider discontinuing boluses if patient is subtherapeutic and just increasing rate as to prevent subsequent supratherapeutic value.     FOLLOW UP / RESULT:

## 2020-10-12 NOTE — PROVIDER CONTACT NOTE (CRITICAL VALUE NOTIFICATION) - ACTION/TREATMENT ORDERED:
Stop infusion for 60 minutes, then decrease dose rate by 300 units/ hr
stop heparin drip for 1hr and decrease by 300u/

## 2020-10-12 NOTE — PROGRESS NOTE ADULT - SUBJECTIVE AND OBJECTIVE BOX
INTERVAL HPI/OVERNIGHT EVENTS: I feel fine.   Vital Signs Last 24 Hrs  T(C): 36.7 (12 Oct 2020 14:09), Max: 36.8 (12 Oct 2020 06:00)  T(F): 98.1 (12 Oct 2020 14:09), Max: 98.3 (12 Oct 2020 06:00)  HR: 75 (12 Oct 2020 14:09) (69 - 76)  BP: 143/82 (12 Oct 2020 14:09) (135/88 - 154/89)  BP(mean): --  RR: 16 (12 Oct 2020 14:09) (16 - 16)  SpO2: 94% (12 Oct 2020 14:09) (94% - 99%)  I&O's Summary    MEDICATIONS  (STANDING):  amLODIPine   Tablet 5 milliGRAM(s) Oral daily  cefTRIAXone   IVPB      cefTRIAXone   IVPB 1000 milliGRAM(s) IV Intermittent every 24 hours  enoxaparin Injectable 80 milliGRAM(s) SubCutaneous every 12 hours  influenza   Vaccine 0.5 milliLiter(s) IntraMuscular once  sodium chloride 0.9%. 1000 milliLiter(s) (100 mL/Hr) IV Continuous <Continuous>  sodium chloride 0.9%. 1000 milliLiter(s) (100 mL/Hr) IV Continuous <Continuous>  sodium chloride 0.9%. 1000 milliLiter(s) (100 mL/Hr) IV Continuous <Continuous>    MEDICATIONS  (PRN):    LABS:                        12.4   7.41  )-----------( 233      ( 12 Oct 2020 09:59 )             37.5     10-12    136  |  104  |  10  ----------------------------<  118<H>  4.1   |  28  |  0.99    Ca    11.2<H>      12 Oct 2020 09:59  Phos  2.0     10-11  Mg     1.6     10-11    TPro  6.3  /  Alb  2.1<L>  /  TBili  1.0  /  DBili  x   /  AST  137<H>  /  ALT  67<H>  /  AlkPhos  161<H>  10-12    PT/INR - ( 10 Oct 2020 22:49 )   PT: 13.3 sec;   INR: 1.12 ratio         PTT - ( 12 Oct 2020 09:59 )  PTT:146.8 sec  Urinalysis Basic - ( 10 Oct 2020 22:28 )    Color: Yellow / Appearance: Slightly Turbid / S.015 / pH: x  Gluc: x / Ketone: Negative  / Bili: Negative / Urobili: Negative   Blood: x / Protein: 15 / Nitrite: Positive   Leuk Esterase: Small / RBC: 2-5 /HPF / WBC 6-10 /HPF   Sq Epi: x / Non Sq Epi: Moderate /HPF / Bacteria: TNTC /HPF      CAPILLARY BLOOD GLUCOSE      POCT Blood Glucose.: 104 mg/dL (11 Oct 2020 21:04)  POCT Blood Glucose.: 130 mg/dL (11 Oct 2020 17:08)        Urinalysis Basic - ( 10 Oct 2020 22:28 )    Color: Yellow / Appearance: Slightly Turbid / S.015 / pH: x  Gluc: x / Ketone: Negative  / Bili: Negative / Urobili: Negative   Blood: x / Protein: 15 / Nitrite: Positive   Leuk Esterase: Small / RBC: 2-5 /HPF / WBC 6-10 /HPF   Sq Epi: x / Non Sq Epi: Moderate /HPF / Bacteria: TNTC /HPF      REVIEW OF SYSTEMS:  CONSTITUTIONAL: No fever, weight loss, or fatigue  EYES: No eye pain, visual disturbances, or discharge  ENMT:  No difficulty hearing, tinnitus, vertigo; No sinus or throat pain  NECK: No pain or stiffness  RESPIRATORY: No cough, wheezing, chills or hemoptysis; No shortness of breath  CARDIOVASCULAR: No chest pain, palpitations, dizziness, or leg swelling  GASTROINTESTINAL: No abdominal or epigastric pain. No nausea, vomiting, or hematemesis; No diarrhea or constipation. No melena or hematochezia.  GENITOURINARY: No dysuria, frequency, hematuria, or incontinence  NEUROLOGICAL: No headaches, memory loss, loss of strength, numbness, or tremors      Consultant(s) Notes Reviewed:  [x ] YES  [ ] NO    PHYSICAL EXAM:  GENERAL: NAD, well-groomed, well-developed, not in any distress ,  HEAD:  Atraumatic, Normocephalic  EYES: EOMI, PERRLA, conjunctiva and sclera clear  ENMT: No tonsillar erythema, exudates, or enlargement; Moist mucous membranes, Good dentition, No lesions  NECK: Supple, No JVD, Normal thyroid  NERVOUS SYSTEM:  Alert & Oriented X3, No focal deficit   CHEST/LUNG: Good air entry bilateral with no  rales, rhonchi, wheezing, or rubs  HEART: Regular rate and rhythm; No murmurs, rubs, or gallops  ABDOMEN: Soft, Nontender, Nondistended; Bowel sounds present  EXTREMITIES:  2+ Peripheral Pulses, No clubbing, cyanosis, or edema  SKIN: No rashes or lesions    Care Discussed with Consultants/Other Providers [ x] YES  [ ] NO

## 2020-10-12 NOTE — CONSULT NOTE ADULT - ASSESSMENT
88 year old lady who had right breast ca s/p surgery and RT 15 years ago.  She just finished hormonal therapy in July.  Now she has recurrence at right axilla, bones and liver with hypercalcemia

## 2020-10-12 NOTE — PROGRESS NOTE ADULT - PROBLEM SELECTOR PLAN 5
Thrombosis within left femoral vein extending into the IVC. No pulmonary embolus.  Will start on Heparin drip once CT head is back, and there is no evidence of bleed in the brain   - Monitor PTT patient presents with generalized fatigue , weakness, anorexia and weight loss since 1 week  - Also reports significant weight loss in last 1 month  - H/O Breast cancer, stopped radiation therapy in July because as per patient, her oncologist recommended that her cancer has gone  - Pan CT of the body shows hypodense lesion in the liver, bones and irregular bladder wall thickening  - UA also positive , No fever or Wbc   - Calcium 12.3 and Albumin 2.7  - Likely due to metastatic cancer   - Will start with NS at 100ml/hr for 24 hours for initial management of hypercalcemia  - f/u Vit D1-25 , PTHrP,   - f/u with Dr Gold for further recommendations   - outpatient records from the oncologist UA+, denies dysuria, frequency, urgency suprapubic pain  -Urine Cx testing  -Cont Rocephin for now  -Blood Cx NTD

## 2020-10-12 NOTE — CONSULT NOTE ADULT - SUBJECTIVE AND OBJECTIVE BOX
Patient is a 88y old  Female who presents with a chief complaint of Generalized weakness (11 Oct 2020 16:50)      HPI:  87 y/o F pt with a significant PMHx of breast cancer s/o surgery, RT 15 years ago(finished hormone treatment in July)  HTN , questionable diabetes and no significant PSHx presents to the ED with c/o generalized fatigue. Patient states extremely tired, decreased appetite, unable to walk, since 1 week associated with significant weight loss since a month. Pt states living with her son, had labs done yesterday which showed that her calcium and liver enzyme levels are elevated.   Patient denies any syncope, fever, chills, chest pain, shortness of breath, abdominal pain, paresthesia, numbness or any other complains.    ED Course:   CT abdomen and Chest with IV contrast shows metastatic lesions in liver, lungs and osteolytic lesions in spine, Clots in femoral vein extending upto IVC  Vital Signs Last 24 Hrs  T(C): 37.1 (10 Oct 2020 19:45), Max: 37.1 (10 Oct 2020 19:45)  T(F): 98.8 (10 Oct 2020 19:45), Max: 98.8 (10 Oct 2020 19:45)  HR: 82 (10 Oct 2020 19:45) (78 - 82)  BP: 131/81 (10 Oct 2020 19:45) (122/84 - 131/81)  RR: 16 (10 Oct 2020 19:45) (16 - 20)  SpO2: 97% (10 Oct 2020 19:45) (97% - 97%) (10 Oct 2020 20:00)       ROS:  Negative except for:    PAST MEDICAL & SURGICAL HISTORY:  Vitamin D deficiency    Gout    Diabetes mellitus    Cholecystitis    Arthritis    Hyperlipidemia    Hypertension    History of cholecystectomy        SOCIAL HISTORY:    FAMILY HISTORY:  Family history of heart disease (Mother)    Family history of diabetes mellitus (DM) (Mother, Sibling)    Family history of hypertension (Father)        MEDICATIONS  (STANDING):  amLODIPine   Tablet 5 milliGRAM(s) Oral daily  cefTRIAXone   IVPB      cefTRIAXone   IVPB 1000 milliGRAM(s) IV Intermittent every 24 hours  heparin  Infusion.  Unit(s)/Hr (15 mL/Hr) IV Continuous <Continuous>  influenza   Vaccine 0.5 milliLiter(s) IntraMuscular once  sodium chloride 0.9%. 1000 milliLiter(s) (100 mL/Hr) IV Continuous <Continuous>  sodium chloride 0.9%. 1000 milliLiter(s) (100 mL/Hr) IV Continuous <Continuous>    MEDICATIONS  (PRN):  heparin   Injectable 6500 Unit(s) IV Push every 6 hours PRN For aPTT less than 40  heparin   Injectable 3000 Unit(s) IV Push every 6 hours PRN For aPTT between 40 - 57      Allergies    Benadryl (Swelling)    Intolerances        Vital Signs Last 24 Hrs  T(C): 36.8 (12 Oct 2020 06:00), Max: 36.8 (12 Oct 2020 06:00)  T(F): 98.3 (12 Oct 2020 06:00), Max: 98.3 (12 Oct 2020 06:00)  HR: 69 (12 Oct 2020 06:00) (69 - 76)  BP: 154/89 (12 Oct 2020 06:00) (127/80 - 154/89)  BP(mean): --  RR: 16 (12 Oct 2020 06:00) (16 - 16)  SpO2: 95% (12 Oct 2020 06:00) (95% - 99%)    PHYSICAL EXAM  General: adult in NAD  HEENT: clear oropharynx, anicteric sclera, pink conjunctiva  Neck: supple  CV: normal S1/S2 with no murmur rubs or gallops  Lungs: positive air movement b/l ant lungs,clear to auscultation, no wheezes, no rales  Abdomen: soft non-tender non-distended, no hepatosplenomegaly  Ext: no clubbing cyanosis or edema  Skin: no rashes and no petechiae  Neuro: alert and oriented X 4, no focal deficits      LABS:                          11.6   7.00  )-----------( 241      ( 11 Oct 2020 11:08 )             33.8         Mean Cell Volume : 83.0 fl  Mean Cell Hemoglobin : 28.5 pg  Mean Cell Hemoglobin Concentration : 34.3 gm/dL  Auto Neutrophil # : x  Auto Lymphocyte # : x  Auto Monocyte # : x  Auto Eosinophil # : x  Auto Basophil # : x  Auto Neutrophil % : x  Auto Lymphocyte % : x  Auto Monocyte % : x  Auto Eosinophil % : x  Auto Basophil % : x      Serial CBC's  10-11 @ 11:08  Hct-33.8 / Hgb-11.6 / Plat-241 / RBC-4.07 / WBC-7.00  Serial CBC's  10-10 @ 17:20  Hct-39.3 / Hgb-13.4 / Plat-288 / RBC-4.71 / WBC-7.37      10-11    136  |  104  |  15  ----------------------------<  115<H>  4.1   |  24  |  1.10    Ca    11.2<H>      11 Oct 2020 11:08  Phos  2.0     10-11  Mg     1.6     10-11    TPro  6.5  /  Alb  2.2<L>  /  TBili  1.0  /  DBili  x   /  AST  137<H>  /  ALT  72<H>  /  AlkPhos  158<H>  10-11      PT/INR - ( 10 Oct 2020 22:49 )   PT: 13.3 sec;   INR: 1.12 ratio         PTT - ( 12 Oct 2020 01:54 )  PTT:>200.0 sec    Ferritin, Serum: 796 ng/mL (10-11 @ 15:34)  Folate, Serum: 5.0 ng/mL (10-11 @ 15:34)  Vitamin B12, Serum: 1830 pg/mL (10-11 @ 15:34)  Iron - Total Binding Capacity.: 191 ug/dL (10-11 @ 11:08)              BLOOD SMEAR INTERPRETATION:       RADIOLOGY & ADDITIONAL STUDIES:  < from: CT Abdomen and Pelvis w/ IV Cont (10.10.20 @ 19:05) >  PROCEDURE:  CT of the Chest, Abdomen and Pelvis was performed with intravenous contrast.  Intravenous contrast: 90 ml Omnipaque 350. 10 ml discarded.  Oral contrast: None.  Sagittal and coronal reformats were performed.    FINDINGS:    CHEST:    LUNGS AND LARGE AIRWAYS: Patent central airways. Indeterminant 3 mm right upper lobe nodule (series 2 image 46). Small calcified granuloma within the right upper lobe. Mild subpleural scarring/linear atelectasis within the right lower lobe.    PLEURA: No pleural effusion.    VESSELS: No pulmonary embolus. No aortic aneurysm or dissection.    HEART: Heart size is normal. No pericardial effusion. Mild coronary calcifications.    MEDIASTINUM AND CATALINO: Borderline mediastinal lymph nodes includin.5 cm short axis left lower part tracheal lymph node. 1.3 cm short axis subcarinal lymph node.    CHEST WALL AND LOWER NECK: Tiny nodules within the thyroid gland. Right breast skin thickening and irregular asymmetric subareolar density. 2.7 x 1.4 cm necrotic lymph nodes within the right axilla.    ABDOMEN AND PELVIS:    LIVER: Small hepatic cysts. Multiple hypodense lesions measuring up to 2 cm within the liver parenchyma likely metastatic disease. Nodular contour of the liver.    < end of copied text >  < from: CT Abdomen and Pelvis w/ IV Cont (10.10.20 @ 19:05) >  BILE DUCTS: Mild biliary ductal dilatation.  GALLBLADDER: Not visualized.  SPLEEN: Within normal limits.  PANCREAS: Within normal limits.  ADRENALS: Within normal limits.  KIDNEYS/URETERS: Within normal limits.    BLADDER: Wall thickening/irregularity  REPRODUCTIVE ORGANS: Hysterectomy.    BOWEL: No bowel obstruction. Appendix not visualized.  PERITONEUM: Trace fluid within the cul-de-sac.  VESSELS: Thrombosis within left femoral vein extending into the IVC. The abdominal aorta and its branches are patent.  RETROPERITONEUM/LYMPH NODES: No lymphadenopathy.  ABDOMINAL WALL: Within normal limits.  BONES: Hypodense lesions with cortical destruction within the left 2nd and 3rd ribs likely metastatic. Lucent bone lesions within the bilateral iliac and femoral bones are indeterminant.    < end of copied text >  < from: CT Abdomen and Pelvis w/ IV Cont (10.10.20 @ 19:05) >  IMPRESSION:    Thrombosis within left femoral vein extending into the IVC. No pulmonary embolus.    Multiple hypodense lesions measuring up to 2 cm within the liver parenchyma likely metastatic disease.    Hypodense lesions within the left 2nd and 3rd ribs with cortical destruction likely metastatic. Lucent bone lesions within the bilateral iliac and femoral bones are indeterminant. Further evaluation can be performed with PET-CT or bone scan.    Right breast skin thickening and irregular asymmetric subareolar density. 2.7 x 1.4 cm necrotic lymph nodes within the right axilla. Suspicious for breast cancer. Recommend further evaluation with mammogram/ultrasound.    Wall thickening/irregularity of the urinary bladder. Recommendfurther evaluation with aortogram or cystoscopy.    Indeterminant 3 mm right upper lobe nodule. Recommend follow-up chest CT in 3 months.    < end of copied text >

## 2020-10-12 NOTE — PROGRESS NOTE ADULT - PROBLEM SELECTOR PLAN 3
patient presents with generalized fatigue , weakness, anorexia and weight loss since 1 week  - Also reports significant weight loss in last 1 month  - H/O Breast cancer, stopped radiation therapy in July because as per patient, her oncologist recommended that her cancer has gone  - Pan CT of the body shows hypodense lesion in the liver, bones and irregular bladder wall thickening  - UA also positive , No fever or Wbc   - Calcium 12.3 and Albumin 2.7  - Likely due to metastatic cancer   - Will start with NS at 100ml/hr for 24 hours for initial management of hypercalcemia  - f/u Vit D1-25 , PTHrP,   - f/u with Dr Gold for further recommendations   - outpatient records from the oncologist patient presents with generalized fatigue , weakness, anorexia and weight loss since 1 week  - Also reports significant weight loss in last 1 month  - H/O Breast cancer, stopped radiation therapy in July because as per patient, her oncologist recommended that her cancer has gone  - Pan CT of the body shows hypodense lesion in the liver, bones and irregular bladder wall thickening  - UA also positive , No fever or Wbc   - Calcium 12.3 and Albumin 2.7  - Likely due to metastatic cancer   - Will start with NS at 100ml/hr for 24 hours for initial management of hypercalcemia  - f/u Vit D1-25 , PTHrP,   - f/u with Dr Gold for further recommendations Albumin 2.1 likely due to metastatic disease  -Reports appetite somewhat decreased, + wt. loss  -Nutrition consult

## 2020-10-12 NOTE — PROGRESS NOTE ADULT - ASSESSMENT
89 y/o F pt with a significant PMHx of breast cancer(finished radiotherapy in July)  HTN and no significant PSHx presents to the ED with c/o generalized fatigue  Admitting for failure to thrive and metastatic cancer evaluation     Problem/Plan - 1:  ·  Problem: Fatigue, unspecified type.  Plan: Multifactorial . Improving.      Problem/Plan - 2:  ·  Problem: UTI (urinary tract infection).  Plan: - - positive UA , no other signs of infection   - afebrile   - no Leukocytosis  - Lactate normal   - Will start with Rocephin   - f/u blood cultures (specimen received)   - f/u urine cultures (specimen received).      Problem/Plan - 3:  ·  Problem: Metastatic breast carcinoma.  Plan: Oncology helping and planning Bx.     Problem/Plan - 4:  ·  Problem: Hypercalcemia.  Plan: Patient has Calcium 12.3   corrected calcium 13.9  -Most likely due to malignancy     Problem/Plan - 5:  ·  Problem: DVT (deep venous thrombosis).  Plan: Thrombosis within left femoral vein extending into the IVC. No pulmonary embolus.  Will start on Heparin drip once CT head is back, and there is no evidence of bleed in the brain   - Monitor PTT.      Problem/Plan - 6:  Problem: Hypertension. Plan: Patient takes Amlodipine 5mg   Will continue with parameters   Dash diet  f.u A1C, Lipid profile.     Problem/Plan - 7:  ·  Problem: Prophylactic measure.  Plan:   Improve score 4  Heparin drip for DVT.     Over all prognosis poor so will consult palliative.

## 2020-10-13 DIAGNOSIS — Z02.9 ENCOUNTER FOR ADMINISTRATIVE EXAMINATIONS, UNSPECIFIED: ICD-10-CM

## 2020-10-13 LAB
-  AMIKACIN: SIGNIFICANT CHANGE UP
-  AMOXICILLIN/CLAVULANIC ACID: SIGNIFICANT CHANGE UP
-  AMPICILLIN/SULBACTAM: SIGNIFICANT CHANGE UP
-  AMPICILLIN: SIGNIFICANT CHANGE UP
-  AZTREONAM: SIGNIFICANT CHANGE UP
-  CEFAZOLIN: SIGNIFICANT CHANGE UP
-  CEFEPIME: SIGNIFICANT CHANGE UP
-  CEFOXITIN: SIGNIFICANT CHANGE UP
-  CEFTRIAXONE: SIGNIFICANT CHANGE UP
-  CIPROFLOXACIN: SIGNIFICANT CHANGE UP
-  ERTAPENEM: SIGNIFICANT CHANGE UP
-  GENTAMICIN: SIGNIFICANT CHANGE UP
-  IMIPENEM: SIGNIFICANT CHANGE UP
-  LEVOFLOXACIN: SIGNIFICANT CHANGE UP
-  MEROPENEM: SIGNIFICANT CHANGE UP
-  NITROFURANTOIN: SIGNIFICANT CHANGE UP
-  PIPERACILLIN/TAZOBACTAM: SIGNIFICANT CHANGE UP
-  TIGECYCLINE: SIGNIFICANT CHANGE UP
-  TOBRAMYCIN: SIGNIFICANT CHANGE UP
-  TRIMETHOPRIM/SULFAMETHOXAZOLE: SIGNIFICANT CHANGE UP
ANION GAP SERPL CALC-SCNC: 6 MMOL/L — SIGNIFICANT CHANGE UP (ref 5–17)
BUN SERPL-MCNC: 9 MG/DL — SIGNIFICANT CHANGE UP (ref 7–18)
CALCIUM SERPL-MCNC: 11.9 MG/DL — HIGH (ref 8.4–10.5)
CEA SERPL-MCNC: 109 NG/ML — HIGH (ref 0–3.8)
CHLORIDE SERPL-SCNC: 105 MMOL/L — SIGNIFICANT CHANGE UP (ref 96–108)
CO2 SERPL-SCNC: 24 MMOL/L — SIGNIFICANT CHANGE UP (ref 22–31)
CREAT SERPL-MCNC: 0.86 MG/DL — SIGNIFICANT CHANGE UP (ref 0.5–1.3)
GLUCOSE SERPL-MCNC: 74 MG/DL — SIGNIFICANT CHANGE UP (ref 70–99)
HCT VFR BLD CALC: 37.4 % — SIGNIFICANT CHANGE UP (ref 34.5–45)
HGB BLD-MCNC: 12.8 G/DL — SIGNIFICANT CHANGE UP (ref 11.5–15.5)
MCHC RBC-ENTMCNC: 28.8 PG — SIGNIFICANT CHANGE UP (ref 27–34)
MCHC RBC-ENTMCNC: 34.2 GM/DL — SIGNIFICANT CHANGE UP (ref 32–36)
MCV RBC AUTO: 84.2 FL — SIGNIFICANT CHANGE UP (ref 80–100)
METHOD TYPE: SIGNIFICANT CHANGE UP
NRBC # BLD: 0 /100 WBCS — SIGNIFICANT CHANGE UP (ref 0–0)
PLATELET # BLD AUTO: 224 K/UL — SIGNIFICANT CHANGE UP (ref 150–400)
POTASSIUM SERPL-MCNC: 5 MMOL/L — SIGNIFICANT CHANGE UP (ref 3.5–5.3)
POTASSIUM SERPL-SCNC: 5 MMOL/L — SIGNIFICANT CHANGE UP (ref 3.5–5.3)
RBC # BLD: 4.44 M/UL — SIGNIFICANT CHANGE UP (ref 3.8–5.2)
RBC # FLD: 15.9 % — HIGH (ref 10.3–14.5)
SODIUM SERPL-SCNC: 135 MMOL/L — SIGNIFICANT CHANGE UP (ref 135–145)
WBC # BLD: 7.58 K/UL — SIGNIFICANT CHANGE UP (ref 3.8–10.5)
WBC # FLD AUTO: 7.58 K/UL — SIGNIFICANT CHANGE UP (ref 3.8–10.5)

## 2020-10-13 PROCEDURE — 99222 1ST HOSP IP/OBS MODERATE 55: CPT

## 2020-10-13 RX ORDER — SODIUM CHLORIDE 9 MG/ML
1000 INJECTION INTRAMUSCULAR; INTRAVENOUS; SUBCUTANEOUS
Refills: 0 | Status: DISCONTINUED | OUTPATIENT
Start: 2020-10-13 | End: 2020-10-15

## 2020-10-13 RX ORDER — ZOLEDRONIC ACID 5 MG/100ML
3 INJECTION, SOLUTION INTRAVENOUS ONCE
Refills: 0 | Status: DISCONTINUED | OUTPATIENT
Start: 2020-10-13 | End: 2020-10-13

## 2020-10-13 RX ORDER — MAGNESIUM HYDROXIDE 400 MG/1
30 TABLET, CHEWABLE ORAL DAILY
Refills: 0 | Status: DISCONTINUED | OUTPATIENT
Start: 2020-10-13 | End: 2020-10-15

## 2020-10-13 RX ORDER — ZOLEDRONIC ACID 5 MG/100ML
4 INJECTION, SOLUTION INTRAVENOUS ONCE
Refills: 0 | Status: COMPLETED | OUTPATIENT
Start: 2020-10-13 | End: 2020-10-13

## 2020-10-13 RX ADMIN — SODIUM CHLORIDE 100 MILLILITER(S): 9 INJECTION INTRAMUSCULAR; INTRAVENOUS; SUBCUTANEOUS at 11:15

## 2020-10-13 RX ADMIN — ZOLEDRONIC ACID 400 MILLIGRAM(S): 5 INJECTION, SOLUTION INTRAVENOUS at 12:29

## 2020-10-13 RX ADMIN — CEFTRIAXONE 100 MILLIGRAM(S): 500 INJECTION, POWDER, FOR SOLUTION INTRAMUSCULAR; INTRAVENOUS at 21:13

## 2020-10-13 RX ADMIN — AMLODIPINE BESYLATE 5 MILLIGRAM(S): 2.5 TABLET ORAL at 05:24

## 2020-10-13 RX ADMIN — ENOXAPARIN SODIUM 80 MILLIGRAM(S): 100 INJECTION SUBCUTANEOUS at 17:45

## 2020-10-13 RX ADMIN — ENOXAPARIN SODIUM 80 MILLIGRAM(S): 100 INJECTION SUBCUTANEOUS at 05:24

## 2020-10-13 NOTE — PROGRESS NOTE ADULT - PROBLEM SELECTOR PLAN 3
Albumin 2.1 likely due to metastatic disease  -Reports appetite somewhat decreased, + wt. loss  -Nutrition consult Albumin 2.1 likely due to metastatic disease  -Reports appetite somewhat decreased, + wt. loss  -Nutrition consult  -Palliative consulted

## 2020-10-13 NOTE — PROGRESS NOTE ADULT - SUBJECTIVE AND OBJECTIVE BOX
INTERVAL HPI/OVERNIGHT EVENTS:  Vital Signs Last 24 Hrs  T(C): 36.9 (13 Oct 2020 14:54), Max: 37.1 (13 Oct 2020 05:05)  T(F): 98.5 (13 Oct 2020 14:54), Max: 98.7 (13 Oct 2020 05:05)  HR: 77 (13 Oct 2020 14:54) (70 - 77)  BP: 150/80 (13 Oct 2020 14:54) (138/82 - 150/80)  BP(mean): --  RR: 17 (13 Oct 2020 14:54) (16 - 17)  SpO2: 100% (13 Oct 2020 14:54) (94% - 100%)  I&O's Summary    MEDICATIONS  (STANDING):  amLODIPine   Tablet 5 milliGRAM(s) Oral daily  cefTRIAXone   IVPB      cefTRIAXone   IVPB 1000 milliGRAM(s) IV Intermittent every 24 hours  enoxaparin Injectable 80 milliGRAM(s) SubCutaneous every 12 hours  influenza   Vaccine 0.5 milliLiter(s) IntraMuscular once  sodium chloride 0.9%. 1000 milliLiter(s) (100 mL/Hr) IV Continuous <Continuous>    MEDICATIONS  (PRN):  magnesium hydroxide Suspension 30 milliLiter(s) Oral daily PRN Constipation    LABS:                        12.8   7.58  )-----------( 224      ( 13 Oct 2020 07:27 )             37.4     10-13    135  |  105  |  9   ----------------------------<  74  5.0   |  24  |  0.86    Ca    11.9<H>      13 Oct 2020 07:27    TPro  6.3  /  Alb  2.1<L>  /  TBili  1.0  /  DBili  x   /  AST  137<H>  /  ALT  67<H>  /  AlkPhos  161<H>  10-12    PTT - ( 12 Oct 2020 09:59 )  PTT:146.8 sec    CAPILLARY BLOOD GLUCOSE              REVIEW OF SYSTEMS:  CONSTITUTIONAL: No fever, weight loss, or fatigue  EYES: No eye pain, visual disturbances, or discharge  ENMT:  No difficulty hearing, tinnitus, vertigo; No sinus or throat pain  NECK: No pain or stiffness  RESPIRATORY: No cough, wheezing, chills or hemoptysis; No shortness of breath  CARDIOVASCULAR: No chest pain, palpitations, dizziness, or leg swelling  GASTROINTESTINAL: No abdominal or epigastric pain. No nausea, vomiting, or hematemesis; No diarrhea or constipation. No melena or hematochezia.  GENITOURINARY: No dysuria, frequency, hematuria, or incontinence  NEUROLOGICAL: No headaches, memory loss, loss of strength, numbness, or tremors      Consultant(s) Notes Reviewed:  [x ] YES  [ ] NO    PHYSICAL EXAM:  GENERAL: NAD, well-groomed, well-developed,not in any distress ,  HEAD:  Atraumatic, Normocephalic  EYES: EOMI, PERRLA, conjunctiva and sclera clear  ENMT: No tonsillar erythema, exudates, or enlargement; Moist mucous membranes, Good dentition, No lesions  NECK: Supple, No JVD, Normal thyroid  NERVOUS SYSTEM:  Alert & Oriented X3, No focal deficit   CHEST/LUNG: Good air entry bilateral with no  rales, rhonchi, wheezing, or rubs  HEART: Regular rate and rhythm; No murmurs, rubs, or gallops  ABDOMEN: Soft, Nontender, Nondistended; Bowel sounds present  EXTREMITIES:  2+ Peripheral Pulses, No clubbing, cyanosis, or edema      Care Discussed with Consultants/Other Providers [ x] YES  [ ] NO

## 2020-10-13 NOTE — PROGRESS NOTE ADULT - PROBLEM SELECTOR PLAN 5
UA+, denies dysuria, frequency, urgency suprapubic pain  -Urine Cx testing  -Cont Rocephin for now  -Blood Cx NTD UA+, denies dysuria, frequency, urgency suprapubic pain  -Urine Cx grew E. Coli  -Cont Rocephin for now  -Blood Cx NTD

## 2020-10-13 NOTE — PROGRESS NOTE ADULT - PROBLEM SELECTOR PLAN 4
Fatigue likely due to met disease superimposed with UTI  - UA +, urine Cx testing  -Blood Cx NTD  -Hypercalcemia slowly improving, may need bisphosphonate Fatigue likely due to met disease superimposed with UTI  - UA +, urine culture grew E. Coli  -Blood Cx NTD  -Persisting hypercalcemia

## 2020-10-13 NOTE — PROGRESS NOTE ADULT - SUBJECTIVE AND OBJECTIVE BOX
feel fine  no N/V  eat well  no BM 4 days  no sob or cp    ED Course:   CT abdomen and Chest with IV contrast shows metastatic lesions in liver, lungs and osteolytic lesions in spine, Clots in femoral vein extending upto IVC  Vital Signs Last 24 Hrs  T(C): 37.1 (10 Oct 2020 19:45), Max: 37.1 (10 Oct 2020 19:45)  T(F): 98.8 (10 Oct 2020 19:45), Max: 98.8 (10 Oct 2020 19:45)  HR: 82 (10 Oct 2020 19:45) (78 - 82)  BP: 131/81 (10 Oct 2020 19:45) (122/84 - 131/81)  RR: 16 (10 Oct 2020 19:45) (16 - 20)  SpO2: 97% (10 Oct 2020 19:45) (97% - 97%) (10 Oct 2020 20:00)     Pt is seen and examined  pt is awake and lying in bed/out of bed to chair  pt seems comfortable and denies any complaints at this time    ROS:  Negative except for:    MEDICATIONS  (STANDING):  amLODIPine   Tablet 5 milliGRAM(s) Oral daily  cefTRIAXone   IVPB      cefTRIAXone   IVPB 1000 milliGRAM(s) IV Intermittent every 24 hours  enoxaparin Injectable 80 milliGRAM(s) SubCutaneous every 12 hours  influenza   Vaccine 0.5 milliLiter(s) IntraMuscular once  sodium chloride 0.9%. 1000 milliLiter(s) (100 mL/Hr) IV Continuous <Continuous>  sodium chloride 0.9%. 1000 milliLiter(s) (100 mL/Hr) IV Continuous <Continuous>  sodium chloride 0.9%. 1000 milliLiter(s) (100 mL/Hr) IV Continuous <Continuous>    MEDICATIONS  (PRN):      Allergies    Benadryl (Swelling)    Intolerances        Vital Signs Last 24 Hrs  T(C): 37.1 (13 Oct 2020 05:05), Max: 37.1 (13 Oct 2020 05:05)  T(F): 98.7 (13 Oct 2020 05:05), Max: 98.7 (13 Oct 2020 05:05)  HR: 70 (13 Oct 2020 05:05) (70 - 75)  BP: 142/85 (13 Oct 2020 05:05) (138/82 - 143/82)  BP(mean): --  RR: 17 (13 Oct 2020 05:05) (16 - 17)  SpO2: 94% (13 Oct 2020 05:05) (94% - 100%)    PHYSICAL EXAM  General: adult in NAD  HEENT: clear oropharynx, anicteric sclera, pink conjunctiva  Neck: supple  CV: normal S1/S2 with no murmur rubs or gallops  Lungs: positive air movement b/l ant lungs,clear to auscultation, no wheezes, no rales  Abdomen: soft non-tender non-distended, no hepatosplenomegaly  Ext: no clubbing cyanosis or edema  Skin: no rashes and no petechiae  Neuro: alert and oriented X 4, no focal deficits  LABS:                          12.8   7.58  )-----------( 224      ( 13 Oct 2020 07:27 )             37.4         Mean Cell Volume : 84.2 fl  Mean Cell Hemoglobin : 28.8 pg  Mean Cell Hemoglobin Concentration : 34.2 gm/dL  Auto Neutrophil # : x  Auto Lymphocyte # : x  Auto Monocyte # : x  Auto Eosinophil # : x  Auto Basophil # : x  Auto Neutrophil % : x  Auto Lymphocyte % : x  Auto Monocyte % : x  Auto Eosinophil % : x  Auto Basophil % : x    Serial CBC  Hematocrit 37.4  Hemoglobin 12.8  Plat 224  RBC 4.44  WBC 7.58  Serial CBC  Hematocrit 37.5  Hemoglobin 12.4  Plat 233  RBC 4.40  WBC 7.41  Serial CBC  Hematocrit 33.8  Hemoglobin 11.6  Plat 241  RBC 4.07  WBC 7.00  Serial CBC  Hematocrit 39.3  Hemoglobin 13.4  Plat 288  RBC 4.71  WBC 7.37    10-13    135  |  105  |  9   ----------------------------<  74  5.0   |  24  |  0.86    Ca    11.9<H>      13 Oct 2020 07:27  Phos  2.0     10-11  Mg     1.6     10-11    TPro  6.3  /  Alb  2.1<L>  /  TBili  1.0  /  DBili  x   /  AST  137<H>  /  ALT  67<H>  /  AlkPhos  161<H>  10-12      PTT - ( 12 Oct 2020 09:59 )  PTT:146.8 sec    Ferritin, Serum: 796 ng/mL (10-11 @ 15:34)  Folate, Serum: 5.0 ng/mL (10-11 @ 15:34)  Vitamin B12, Serum: 1830 pg/mL (10-11 @ 15:34)  Iron - Total Binding Capacity.: 191 ug/dL (10-11 @ 11:08)            BLOOD SMEAR INTERPRETATION:       RADIOLOGY & ADDITIONAL STUDIES:

## 2020-10-13 NOTE — CONSULT NOTE ADULT - ASSESSMENT
88F with L femoral vein thrombosis extending to IVC    -Continue anticoagulation  -No further vascular intervention warranted at this time  -Please reconsult as needed  -Remainder of care per primary team

## 2020-10-13 NOTE — PROGRESS NOTE ADULT - PROBLEM SELECTOR PLAN 6
CT A/P shows Thrombosis within left femoral vein extending into the IVC. No pulmonary embolus.  -Was started on Heparin gtt, now switched to full dose Lovenox CT A/P shows Thrombosis within left femoral vein extending into the IVC. No pulmonary embolus.  -Was started on Heparin gtt, now switched to full dose Lovenox  -Vascular consulted for possible further interventions

## 2020-10-13 NOTE — PROGRESS NOTE ADULT - ASSESSMENT
w  · Assessment	  88 year old lady who had right breast ca s/p surgery and RT 15 years ago.  She just finished hormonal therapy in July.  Now she has recurrence at right axilla, bones and liver with hypercalcemia    Problem/Recommendation - 1:  Problem: Hypercalcemia. Recommendation: probably from mets  will give zometa.    Problem/Recommendation - 2:  ·  Problem: DVT (deep venous thrombosis).  Recommendation: up to IVC  ?vascular to evaluate for TPA  on heparin.   no evidence of PE    Problem/Recommendation - 3:  ·  Problem: Metastatic breast carcinoma.  Recommendation: will need a rebiopsy to see any change of characteristics  will discuss with family if they want to go through this.

## 2020-10-13 NOTE — PROGRESS NOTE ADULT - SUBJECTIVE AND OBJECTIVE BOX
[facility]   MD MARCELLO MCBRIDE MD RUORU WONG, PA    TEL:  OFFICE: [phone]  DR SANCHEZ CELL: [phone]  FRANCISCO JAVIER MILLER CELL: [phone]  DR. TORRES CELL: [phone]  DR. CHÁVEZ CELL: [phone]    FROM 5 PM - 7 AM PLEASE CALL ANSWERING SERVICE: [phone]    RENAL FOLLOW UP NOTE--Date of Service 10-13-20 @ 11:00  --------------------------------------------------------------------------------  HPI:      Pt seen and examined at bedside.   Denies SOB, chest pain     PAST HISTORY  --------------------------------------------------------------------------------  No significant changes to PMH, PSH, FHx, SHx, unless otherwise noted    ALLERGIES & MEDICATIONS  --------------------------------------------------------------------------------  Allergies    Benadryl (Swelling)    Intolerances      Standing Inpatient Medications  amLODIPine   Tablet 5 milliGRAM(s) Oral daily  cefTRIAXone   IVPB      cefTRIAXone   IVPB 1000 milliGRAM(s) IV Intermittent every 24 hours  enoxaparin Injectable 80 milliGRAM(s) SubCutaneous every 12 hours  influenza   Vaccine 0.5 milliLiter(s) IntraMuscular once  sodium chloride 0.9%. 1000 milliLiter(s) IV Continuous <Continuous>  sodium chloride 0.9%. 1000 milliLiter(s) IV Continuous <Continuous>  sodium chloride 0.9%. 1000 milliLiter(s) IV Continuous <Continuous>  zoledronic acid IVPB (ZOMETA) (Non - oncologic) 4 milliGRAM(s) IV Intermittent once    PRN Inpatient Medications  magnesium hydroxide Suspension 30 milliLiter(s) Oral daily PRN      REVIEW OF SYSTEMS  --------------------------------------------------------------------------------  General: no fever  CVS: no chest pain  RESP: no sob, no cough  ABD: no abdominal pain  : no dysuria,  MSK: no edema     VITALS/PHYSICAL EXAM  --------------------------------------------------------------------------------  T(C): 37.1 (10-13-20 @ 05:05), Max: 37.1 (10-13-20 @ 05:05)  HR: 70 (10-13-20 @ 05:05) (70 - 75)  BP: 142/85 (10-13-20 @ 05:05) (138/82 - 143/82)  RR: 17 (10-13-20 @ 05:05) (16 - 17)  SpO2: 94% (10-13-20 @ 05:05) (94% - 100%)  Wt(kg): --        Physical Exam:  	Gen: NAD  	HEENT: MMM  	Pulm: CTA B/L  	CV: S1S2  	Abd: Soft, +BS  	Ext: No LE edema B/L                      Neuro: Awake   	Skin: Warm and Dry   	Vascular access: no hd cathter           no george  LABS/STUDIES  --------------------------------------------------------------------------------              12.8   7.58  >-----------<  224      [10-13-20 @ 07:27]              37.4     135  |  105  |  9   ----------------------------<  74      [10-13-20 @ 07:27]  5.0   |  24  |  0.86        Ca     11.9     [10-13-20 @ 07:27]      Mg     1.6     [10-11-20 @ 11:08]      Phos  2.0     [10-11-20 @ 11:08]    TPro  6.3  /  Alb  2.1  /  TBili  1.0  /  DBili  x   /  AST  137  /  ALT  67  /  AlkPhos  161  [10-12-20 @ 09:59]      PTT: 146.8      [10-12-20 @ 09:59]      Creatinine Trend:  SCr 0.86 [10-13 @ 07:27]  SCr 0.99 [10-12 @ 09:59]  SCr 1.10 [10-11 @ 11:08]  SCr 1.28 [10-10 @ 17:20]    Urinalysis - [10-10-20 @ 22:28]      Color Yellow / Appearance Slightly Turbid / SG 1.015 / pH 5.0      Gluc Negative / Ketone Negative  / Bili Negative / Urobili Negative       Blood Trace / Protein 15 / Leuk Est Small / Nitrite Positive      RBC 2-5 / WBC 6-10 / Hyaline  / Gran  / Sq Epi  / Non Sq Epi Moderate / Bacteria TNTC      Iron 46, TIBC 191, %sat 24      [10-11-20 @ 11:08]  Ferritin 796      [10-11-20 @ 15:34]  Vitamin D (25OH) 40.8      [10-11-20 @ 15:34]  TSH 0.50      [10-11-20 @ 11:08]  Lipid: chol 94, TG 50, HDL 53, LDL 31      [10-11-20 @ 11:08]

## 2020-10-13 NOTE — CHART NOTE - NSCHARTNOTEFT_GEN_A_CORE
Spoke to pt.'s son Wild who called to inquire about pt.'s condition and progress.  Son updated re current condition and plan of care.  Informed that discharge planning for Wed/Thurs in progress.  Son states that regardless of PT recommendations, pt. to be discharged to home.  Safety concerns discussed as pt. is with generalized weakness.   Will f/u PT reccs, likely will discharge to home with home PT.

## 2020-10-13 NOTE — PROGRESS NOTE ADULT - ASSESSMENT
89 y/o F pt with a significant PMHx of breast cancer(finished radiotherapy in July)  HTN , questionable diabetes and no significant PSHx presents to the ED with c/o generalized fatigue. Patient states extremely tired, decreased appetite, unable to walk, since 1 week associated with significant weight loss since a month. Pt states living with her son, had labs done yesterday which showed that her calcium and liver enzyme levels are elevated.   Patient denies any syncope, fever, chills, chest pain, shortness of breath, abdominal pain, paresthesia, numbness or any other complains.  Pt. with CT A/P/C suspicious for metastatic Ca associated with hypercalcemia (Ca 12.3) and increased generalized weakness.  Pt. also with Thrombosis within left femoral vein extending into the IVC. No pulmonary embolus.  Pt. admitted to medicine, followed by nephro and hem/onc, was started on Heparin gtt. which was transitioned to full dose Lovenox.  Pt. was also noted with UTI, treated with IV Rocephin.  Pt. seen and examined, continues to c/o generalized weakness, denies pain.  Pt. is HD stable, afebrile with no leukocytosis.  Her Ca is slowly improving, 11.2 today.     89 y/o F pt with a significant PMHx of breast cancer(finished radiotherapy in July)  HTN , questionable diabetes and no significant PSHx presents to the ED with c/o generalized fatigue. Patient states extremely tired, decreased appetite, unable to walk, since 1 week associated with significant weight loss since a month. Pt states living with her son, had labs done yesterday which showed that her calcium and liver enzyme levels are elevated.   Patient denies any syncope, fever, chills, chest pain, shortness of breath, abdominal pain, paresthesia, numbness or any other complains.  Pt. with CT A/P/C suspicious for metastatic Ca associated with hypercalcemia (Ca 12.3) and increased generalized weakness.  Pt. also with Thrombosis within left femoral vein extending into the IVC. No pulmonary embolus.  Pt. admitted to medicine, followed by nephro and hem/onc, was started on Heparin gtt. which was transitioned to full dose Lovenox.  Pt. was also noted with UTI, treated with IV Rocephin.  Pt. seen and examined, continues to c/o generalized weakness, denies pain.  Pt. is HD stable, afebrile with no leukocytosis.  Her Ca is slowly improving, 11.2 today.    10/13-Persisting hypercalcemia 11.9 today, received Zometa 4mg x 1 dose per Dr. Gold.       89 y/o F pt with a significant PMHx of breast cancer(finished radiotherapy in July)  HTN , questionable diabetes and no significant PSHx presents to the ED with c/o generalized fatigue. Patient states extremely tired, decreased appetite, unable to walk, since 1 week associated with significant weight loss since a month. Pt states living with her son, had labs done yesterday which showed that her calcium and liver enzyme levels are elevated.   Patient denies any syncope, fever, chills, chest pain, shortness of breath, abdominal pain, paresthesia, numbness or any other complains.  Pt. with CT A/P/C suspicious for metastatic Ca associated with hypercalcemia (Ca 12.3) and increased generalized weakness.  Pt. also with Thrombosis within left femoral vein extending into the IVC. No pulmonary embolus.  Pt. admitted to medicine, followed by nephro and hem/onc, was started on Heparin gtt. which was transitioned to full dose Lovenox.  Pt. was also noted with UTI, treated with IV Rocephin.  Pt. seen and examined, continues to c/o generalized weakness, denies pain.  Pt. is HD stable, afebrile with no leukocytosis.  Her Ca is slowly improving, 11.2 today.    10/13-Persisting hypercalcemia 11.9 today, received Zometa 4mg x 1 dose per Dr. Gold, will monitor effect, IVF hydration continues.  Vascular consulted for evaluation of fem thrombus for possible further interventions  Pt. will likely need rebiopsy per Dr. Gold.  Currently on full dose Lovenox, will likely biopsy as OP  Discharge planning pending PT evaluation, pt. will likely be medically stable for discharge Wed/Thurs.

## 2020-10-13 NOTE — PROGRESS NOTE ADULT - PROBLEM SELECTOR PLAN 1
Pt. with Hx breast Ca 15yrs ago, now with recurrence at right axilla, bones and liver with hypercalcemia  -CT A/P/C findings as above  -Per hem/onc pt. will need rebiopsy, will need family agreement. Pt. with Hx breast Ca 15yrs ago, now with recurrence at right axilla, bones and liver with hypercalcemia  -CT A/P/C findings as above  -Per hem/onc pt. will need rebiopsy, will need family agreement.  -Pt. is currently on full dose Lovenox for fem thrombosis, will likely biopsy as OP

## 2020-10-13 NOTE — PROGRESS NOTE ADULT - SUBJECTIVE AND OBJECTIVE BOX
NP Note discussed with  Primary Attending    Patient is a 88y old  Female who presents with a chief complaint of Generalized weakness (13 Oct 2020 10:01)      INTERVAL HPI/OVERNIGHT EVENTS: no new complaints    MEDICATIONS  (STANDING):  amLODIPine   Tablet 5 milliGRAM(s) Oral daily  cefTRIAXone   IVPB      cefTRIAXone   IVPB 1000 milliGRAM(s) IV Intermittent every 24 hours  enoxaparin Injectable 80 milliGRAM(s) SubCutaneous every 12 hours  influenza   Vaccine 0.5 milliLiter(s) IntraMuscular once  sodium chloride 0.9%. 1000 milliLiter(s) (100 mL/Hr) IV Continuous <Continuous>  zoledronic acid IVPB (ZOMETA) (Non - oncologic) 3 milliGRAM(s) IV Intermittent once    MEDICATIONS  (PRN):  magnesium hydroxide Suspension 30 milliLiter(s) Oral daily PRN Constipation      __________________________________________________  REVIEW OF SYSTEMS:    CONSTITUTIONAL: No fever,   EYES: no acute visual disturbances  NECK: No pain or stiffness  RESPIRATORY: No cough; No shortness of breath  CARDIOVASCULAR: No chest pain, no palpitations  GASTROINTESTINAL: No pain. No nausea or vomiting; No diarrhea   NEUROLOGICAL: No headache or numbness, no tremors  MUSCULOSKELETAL: No joint pain, no muscle pain  GENITOURINARY: no dysuria, no frequency, no hesitancy  PSYCHIATRY: no depression , no anxiety  ALL OTHER  ROS negative        Vital Signs Last 24 Hrs  T(C): 37.1 (13 Oct 2020 05:05), Max: 37.1 (13 Oct 2020 05:05)  T(F): 98.7 (13 Oct 2020 05:05), Max: 98.7 (13 Oct 2020 05:05)  HR: 70 (13 Oct 2020 05:05) (70 - 75)  BP: 142/85 (13 Oct 2020 05:05) (138/82 - 143/82)  BP(mean): --  RR: 17 (13 Oct 2020 05:05) (16 - 17)  SpO2: 94% (13 Oct 2020 05:05) (94% - 100%)    ________________________________________________  PHYSICAL EXAM: Comfortable, reports generalized weakness  GENERAL: NAD  HEENT: Normocephalic;  conjunctivae and sclerae clear; moist mucous membranes;   NECK : supple  CHEST/LUNG: Clear to auscultation bilaterally with good air entry   HEART: S1 S2  regular; no murmurs, gallops or rubs  ABDOMEN: Soft, Nontender, Nondistended; Bowel sounds present  EXTREMITIES: no cyanosis; no edema; no calf tenderness  SKIN: warm and dry; no rash  NERVOUS SYSTEM:  Awake and alert; Oriented  to place, person and time ; no new deficits    _________________________________________________  LABS:                        12.8   7.58  )-----------( 224      ( 13 Oct 2020 07:27 )             37.4     10-13    135  |  105  |  9   ----------------------------<  74  5.0   |  24  |  0.86    Ca    11.9<H>      13 Oct 2020 07:27    TPro  6.3  /  Alb  2.1<L>  /  TBili  1.0  /  DBili  x   /  AST  137<H>  /  ALT  67<H>  /  AlkPhos  161<H>  1012    PTT - ( 12 Oct 2020 09:59 )  PTT:146.8 sec    CAPILLARY BLOOD GLUCOSE      RADIOLOGY & ADDITIONAL TESTS:    CT Head No Cont (10.11.20 @ 03:59) >  EXAM:  CT BRAIN                            PROCEDURE DATE:  10/11/2020          INTERPRETATION:  CT HEAD WITHOUT CONTRAST    INDICATION: 88 years old. Female. metastatic disease H/o breast cancer. Weakness    COMPARISON: None available.    TECHNIQUE: Noncontrast axial CT head was obtained from the skull base to vertex.    FINDINGS:  No acute intracranial hemorrhage, mass effect or midline shift.  No CT evidence of acute large territory vascular infarct.  The ventricles and cortical sulci are within normal limits for age.  Patchy hypodensities in the periventricular white matter are nonspecific, but likely sequela of small vessel ischemic disease.    Small mucus retention cyst/polyp in the left maxillary. The mastoid air cells are well aerated.  No displaced calvarial fracture.    IMPRESSION:  No acute intracranial hemorrhage or mass effect. Further evaluation with MRI may be performed as clinically indicated.    < end of copied text >      CT Abdomen and Pelvis w/ IV Cont (10.10.20 @ 19:05) >  EXAM:  CT ABDOMEN AND PELVIS IC                          EXAM:  CT CHEST IC                            PROCEDURE DATE:  10/10/2020          INTERPRETATION:  CLINICAL INFORMATION: Hypercalcemia, fatigue, evaluate for metastases    COMPARISON: None.    PROCEDURE:  CT of the Chest, Abdomen and Pelvis was performed with intravenous contrast.  Intravenous contrast: 90 ml Omnipaque 350. 10 ml discarded.  Oral contrast: None.  Sagittal and coronal reformats were performed.    FINDINGS:    CHEST:    LUNGS AND LARGE AIRWAYS: Patent central airways. Indeterminant 3 mm right upper lobe nodule (series 2 image 46). Small calcified granuloma within the right upper lobe. Mild subpleural scarring/linear atelectasis within the right lower lobe.    PLEURA: No pleural effusion.    VESSELS: No pulmonary embolus. No aortic aneurysm or dissection.    HEART: Heart size is normal. No pericardial effusion. Mild coronary calcifications.    MEDIASTINUM AND CATALINO: Borderline mediastinal lymph nodes includin.5 cm short axis left lower part tracheal lymph node. 1.3 cm short axis subcarinal lymph node.    CHEST WALL AND LOWER NECK: Tiny nodules within the thyroid gland. Right breast skin thickening and irregular asymmetric subareolar density. 2.7 x 1.4 cm necrotic lymph nodes within the right axilla.    ABDOMEN AND PELVIS:    LIVER: Small hepatic cysts. Multiple hypodense lesions measuring up to 2 cm within the liver parenchyma likely metastatic disease. Nodular contour of the liver.  BILE DUCTS: Mild biliary ductal dilatation.  GALLBLADDER: Not visualized.  SPLEEN: Within normal limits.  PANCREAS: Within normal limits.  ADRENALS: Within normal limits.  KIDNEYS/URETERS: Within normal limits.    BLADDER: Wall thickening/irregularity  REPRODUCTIVE ORGANS: Hysterectomy.    BOWEL: No bowel obstruction. Appendix not visualized.  PERITONEUM: Trace fluid within the cul-de-sac.  VESSELS: Thrombosis within left femoral vein extending into the IVC. The abdominal aorta and its branches are patent.  RETROPERITONEUM/LYMPH NODES: No lymphadenopathy.  ABDOMINAL WALL: Within normal limits.  BONES: Hypodense lesions with cortical destruction within the left 2nd and 3rd ribs likely metastatic. Lucent bone lesions within the bilateral iliac and femoral bones are indeterminant.    IMPRESSION:    Thrombosis within left femoral vein extending into the IVC. No pulmonary embolus.    Multiple hypodense lesions measuring up to 2 cm within the liver parenchyma likely metastatic disease.    Hypodense lesions within the left 2nd and 3rd ribs with cortical destruction likely metastatic. Lucent bone lesions within the bilateral iliac and femoral bones are indeterminant. Further evaluation can be performed with PET-CT or bone scan.    Right breast skin thickening and irregular asymmetric subareolar density. 2.7 x 1.4 cm necrotic lymph nodes within the right axilla. Suspicious for breast cancer. Recommend further evaluation with mammogram/ultrasound.    Wall thickening/irregularity of the urinary bladder. Recommendfurther evaluation with aortogram or cystoscopy.    Indeterminant 3 mm right upper lobe nodule. Recommend follow-up chest CT in 3 months.      These findings were discussed with Dr. FLORES, on 10/10/2020 7:43 PM with read back.    < end of copied text >  Imaging Personally Reviewed:  YES  Consultant(s) Notes Reviewed:   YES  Care Discussed with Consultants : Hem/onc, nephro    Plan of care was discussed with patient and /or primary care giver; all questions and concerns were addressed and care was aligned with patient's wishes.     NP Note discussed with  Primary Attending    Patient is a 88y old  Female who presents with a chief complaint of Generalized weakness (13 Oct 2020 10:01)      INTERVAL HPI/OVERNIGHT EVENTS: no new complaints    MEDICATIONS  (STANDING):  amLODIPine   Tablet 5 milliGRAM(s) Oral daily  cefTRIAXone   IVPB      cefTRIAXone   IVPB 1000 milliGRAM(s) IV Intermittent every 24 hours  enoxaparin Injectable 80 milliGRAM(s) SubCutaneous every 12 hours  influenza   Vaccine 0.5 milliLiter(s) IntraMuscular once  sodium chloride 0.9%. 1000 milliLiter(s) (100 mL/Hr) IV Continuous <Continuous>  zoledronic acid IVPB (ZOMETA) (Non - oncologic) 3 milliGRAM(s) IV Intermittent once    MEDICATIONS  (PRN):  magnesium hydroxide Suspension 30 milliLiter(s) Oral daily PRN Constipation      __________________________________________________  REVIEW OF SYSTEMS:    CONSTITUTIONAL: No fever,   EYES: no acute visual disturbances  NECK: No pain or stiffness  RESPIRATORY: No cough; No shortness of breath  CARDIOVASCULAR: No chest pain, no palpitations  GASTROINTESTINAL: No pain. No nausea or vomiting; No diarrhea   NEUROLOGICAL: No headache or numbness, no tremors  MUSCULOSKELETAL: No joint pain, no muscle pain  GENITOURINARY: no dysuria, no frequency, no hesitancy  PSYCHIATRY: no depression , no anxiety  ALL OTHER  ROS negative        Vital Signs Last 24 Hrs  T(C): 37.1 (13 Oct 2020 05:05), Max: 37.1 (13 Oct 2020 05:05)  T(F): 98.7 (13 Oct 2020 05:05), Max: 98.7 (13 Oct 2020 05:05)  HR: 70 (13 Oct 2020 05:05) (70 - 75)  BP: 142/85 (13 Oct 2020 05:05) (138/82 - 143/82)  BP(mean): --  RR: 17 (13 Oct 2020 05:05) (16 - 17)  SpO2: 94% (13 Oct 2020 05:05) (94% - 100%)    ________________________________________________  PHYSICAL EXAM: Comfortable, reports generalized weakness  GENERAL: NAD  HEENT: Normocephalic;  conjunctivae and sclerae clear; moist mucous membranes;   NECK : supple  CHEST/LUNG: Clear to auscultation bilaterally with good air entry   HEART: S1 S2  regular; no murmurs, gallops or rubs  ABDOMEN: Soft, Nontender, Nondistended; Bowel sounds present  EXTREMITIES: no cyanosis; no edema; no calf tenderness  SKIN: warm and dry; no rash  NERVOUS SYSTEM:  Awake and alert; Oriented  to place, person and time ; no new deficits    _________________________________________________  LABS:                        12.8   7.58  )-----------( 224      ( 13 Oct 2020 07:27 )             37.4     10-13    135  |  105  |  9   ----------------------------<  74  5.0   |  24  |  0.86    Ca    11.9<H>      13 Oct 2020 07:27    TPro  6.3  /  Alb  2.1<L>  /  TBili  1.0  /  DBili  x   /  AST  137<H>  /  ALT  67<H>  /  AlkPhos  161<H>  1012    PTT - ( 12 Oct 2020 09:59 )  PTT:146.8 sec    CAPILLARY BLOOD GLUCOSE      RADIOLOGY & ADDITIONAL TESTS:    CT Head No Cont (10.11.20 @ 03:59) >  EXAM:  CT BRAIN                            PROCEDURE DATE:  10/11/2020          INTERPRETATION:  CT HEAD WITHOUT CONTRAST    INDICATION: 88 years old. Female. metastatic disease H/o breast cancer. Weakness    COMPARISON: None available.    TECHNIQUE: Noncontrast axial CT head was obtained from the skull base to vertex.    FINDINGS:  No acute intracranial hemorrhage, mass effect or midline shift.  No CT evidence of acute large territory vascular infarct.  The ventricles and cortical sulci are within normal limits for age.  Patchy hypodensities in the periventricular white matter are nonspecific, but likely sequela of small vessel ischemic disease.    Small mucus retention cyst/polyp in the left maxillary. The mastoid air cells are well aerated.  No displaced calvarial fracture.    IMPRESSION:  No acute intracranial hemorrhage or mass effect. Further evaluation with MRI may be performed as clinically indicated.    < end of copied text >      CT Abdomen and Pelvis w/ IV Cont (10.10.20 @ 19:05) >  EXAM:  CT ABDOMEN AND PELVIS IC                          EXAM:  CT CHEST IC                            PROCEDURE DATE:  10/10/2020          INTERPRETATION:  CLINICAL INFORMATION: Hypercalcemia, fatigue, evaluate for metastases    COMPARISON: None.    PROCEDURE:  CT of the Chest, Abdomen and Pelvis was performed with intravenous contrast.  Intravenous contrast: 90 ml Omnipaque 350. 10 ml discarded.  Oral contrast: None.  Sagittal and coronal reformats were performed.    FINDINGS:    CHEST:    LUNGS AND LARGE AIRWAYS: Patent central airways. Indeterminant 3 mm right upper lobe nodule (series 2 image 46). Small calcified granuloma within the right upper lobe. Mild subpleural scarring/linear atelectasis within the right lower lobe.    PLEURA: No pleural effusion.    VESSELS: No pulmonary embolus. No aortic aneurysm or dissection.    HEART: Heart size is normal. No pericardial effusion. Mild coronary calcifications.    MEDIASTINUM AND CATALINO: Borderline mediastinal lymph nodes includin.5 cm short axis left lower part tracheal lymph node. 1.3 cm short axis subcarinal lymph node.    CHEST WALL AND LOWER NECK: Tiny nodules within the thyroid gland. Right breast skin thickening and irregular asymmetric subareolar density. 2.7 x 1.4 cm necrotic lymph nodes within the right axilla.    ABDOMEN AND PELVIS:    LIVER: Small hepatic cysts. Multiple hypodense lesions measuring up to 2 cm within the liver parenchyma likely metastatic disease. Nodular contour of the liver.  BILE DUCTS: Mild biliary ductal dilatation.  GALLBLADDER: Not visualized.  SPLEEN: Within normal limits.  PANCREAS: Within normal limits.  ADRENALS: Within normal limits.  KIDNEYS/URETERS: Within normal limits.    BLADDER: Wall thickening/irregularity  REPRODUCTIVE ORGANS: Hysterectomy.    BOWEL: No bowel obstruction. Appendix not visualized.  PERITONEUM: Trace fluid within the cul-de-sac.  VESSELS: Thrombosis within left femoral vein extending into the IVC. The abdominal aorta and its branches are patent.  RETROPERITONEUM/LYMPH NODES: No lymphadenopathy.  ABDOMINAL WALL: Within normal limits.  BONES: Hypodense lesions with cortical destruction within the left 2nd and 3rd ribs likely metastatic. Lucent bone lesions within the bilateral iliac and femoral bones are indeterminant.    IMPRESSION:    Thrombosis within left femoral vein extending into the IVC. No pulmonary embolus.    Multiple hypodense lesions measuring up to 2 cm within the liver parenchyma likely metastatic disease.    Hypodense lesions within the left 2nd and 3rd ribs with cortical destruction likely metastatic. Lucent bone lesions within the bilateral iliac and femoral bones are indeterminant. Further evaluation can be performed with PET-CT or bone scan.    Right breast skin thickening and irregular asymmetric subareolar density. 2.7 x 1.4 cm necrotic lymph nodes within the right axilla. Suspicious for breast cancer. Recommend further evaluation with mammogram/ultrasound.    Wall thickening/irregularity of the urinary bladder. Recommendfurther evaluation with aortogram or cystoscopy.    Indeterminant 3 mm right upper lobe nodule. Recommend follow-up chest CT in 3 months.      These findings were discussed with Dr. FLORES, on 10/10/2020 7:43 PM with read back.    < end of copied text >      Imaging Personally Reviewed:  YES  Consultant(s) Notes Reviewed:   YES  Care Discussed with Consultants : Hem/onc, nephro    Plan of care was discussed with patient and /or primary care giver; all questions and concerns were addressed and care was aligned with patient's wishes.     NP Note discussed with  Primary Attending    Patient is a 88y old  Female who presents with a chief complaint of Generalized weakness (13 Oct 2020 10:01)      INTERVAL HPI/OVERNIGHT EVENTS: no new complaints    MEDICATIONS  (STANDING):  amLODIPine   Tablet 5 milliGRAM(s) Oral daily  cefTRIAXone   IVPB      cefTRIAXone   IVPB 1000 milliGRAM(s) IV Intermittent every 24 hours  enoxaparin Injectable 80 milliGRAM(s) SubCutaneous every 12 hours  influenza   Vaccine 0.5 milliLiter(s) IntraMuscular once  sodium chloride 0.9%. 1000 milliLiter(s) (100 mL/Hr) IV Continuous <Continuous>  zoledronic acid IVPB (ZOMETA) (Non - oncologic) 3 milliGRAM(s) IV Intermittent once    MEDICATIONS  (PRN):  magnesium hydroxide Suspension 30 milliLiter(s) Oral daily PRN Constipation      __________________________________________________  REVIEW OF SYSTEMS: Comfortable with generalized weakness    CONSTITUTIONAL: No fever,   EYES: no acute visual disturbances  NECK: No pain or stiffness  RESPIRATORY: No cough; No shortness of breath  CARDIOVASCULAR: No chest pain, no palpitations  GASTROINTESTINAL: No pain. No nausea or vomiting; No diarrhea   NEUROLOGICAL: No headache or numbness, no tremors  MUSCULOSKELETAL: No joint pain, no muscle pain  GENITOURINARY: no dysuria, no frequency, no hesitancy  PSYCHIATRY: no depression , no anxiety  ALL OTHER  ROS negative        Vital Signs Last 24 Hrs  T(C): 37.1 (13 Oct 2020 05:05), Max: 37.1 (13 Oct 2020 05:05)  T(F): 98.7 (13 Oct 2020 05:05), Max: 98.7 (13 Oct 2020 05:05)  HR: 70 (13 Oct 2020 05:05) (70 - 75)  BP: 142/85 (13 Oct 2020 05:05) (138/82 - 143/82)  BP(mean): --  RR: 17 (13 Oct 2020 05:05) (16 - 17)  SpO2: 94% (13 Oct 2020 05:05) (94% - 100%)    ________________________________________________  PHYSICAL EXAM: Comfortable, reports generalized weakness  GENERAL: NAD  HEENT: Normocephalic;  conjunctivae and sclerae clear; moist mucous membranes;   NECK : supple  CHEST/LUNG: Clear to auscultation bilaterally with good air entry   HEART: S1 S2  regular; no murmurs, gallops or rubs  ABDOMEN: Soft, Nontender, Nondistended; Bowel sounds present  EXTREMITIES: no cyanosis; no edema; no calf tenderness  SKIN: warm and dry; no rash  NERVOUS SYSTEM:  Awake and alert; Oriented  to place, person and time ; no new deficits    _________________________________________________  LABS:                        12.8   7.58  )-----------( 224      ( 13 Oct 2020 07:27 )             37.4     10-13    135  |  105  |  9   ----------------------------<  74  5.0   |  24  |  0.86    Ca    11.9<H>      13 Oct 2020 07:27    TPro  6.3  /  Alb  2.1<L>  /  TBili  1.0  /  DBili  x   /  AST  137<H>  /  ALT  67<H>  /  AlkPhos  161<H>  10-12    PTT - ( 12 Oct 2020 09:59 )  PTT:146.8 sec    CAPILLARY BLOOD GLUCOSE      RADIOLOGY & ADDITIONAL TESTS:    CT Head No Cont (10.11.20 @ 03:59) >  EXAM:  CT BRAIN                            PROCEDURE DATE:  10/11/2020          INTERPRETATION:  CT HEAD WITHOUT CONTRAST    INDICATION: 88 years old. Female. metastatic disease H/o breast cancer. Weakness    COMPARISON: None available.    TECHNIQUE: Noncontrast axial CT head was obtained from the skull base to vertex.    FINDINGS:  No acute intracranial hemorrhage, mass effect or midline shift.  No CT evidence of acute large territory vascular infarct.  The ventricles and cortical sulci are within normal limits for age.  Patchy hypodensities in the periventricular white matter are nonspecific, but likely sequela of small vessel ischemic disease.    Small mucus retention cyst/polyp in the left maxillary. The mastoid air cells are well aerated.  No displaced calvarial fracture.    IMPRESSION:  No acute intracranial hemorrhage or mass effect. Further evaluation with MRI may be performed as clinically indicated.    < end of copied text >      CT Abdomen and Pelvis w/ IV Cont (10.10.20 @ 19:05) >  EXAM:  CT ABDOMEN AND PELVIS IC                          EXAM:  CT CHEST IC                            PROCEDURE DATE:  10/10/2020          INTERPRETATION:  CLINICAL INFORMATION: Hypercalcemia, fatigue, evaluate for metastases    COMPARISON: None.    PROCEDURE:  CT of the Chest, Abdomen and Pelvis was performed with intravenous contrast.  Intravenous contrast: 90 ml Omnipaque 350. 10 ml discarded.  Oral contrast: None.  Sagittal and coronal reformats were performed.    FINDINGS:    CHEST:    LUNGS AND LARGE AIRWAYS: Patent central airways. Indeterminant 3 mm right upper lobe nodule (series 2 image 46). Small calcified granuloma within the right upper lobe. Mild subpleural scarring/linear atelectasis within the right lower lobe.    PLEURA: No pleural effusion.    VESSELS: No pulmonary embolus. No aortic aneurysm or dissection.    HEART: Heart size is normal. No pericardial effusion. Mild coronary calcifications.    MEDIASTINUM AND CATALINO: Borderline mediastinal lymph nodes includin.5 cm short axis left lower part tracheal lymph node. 1.3 cm short axis subcarinal lymph node.    CHEST WALL AND LOWER NECK: Tiny nodules within the thyroid gland. Right breast skin thickening and irregular asymmetric subareolar density. 2.7 x 1.4 cm necrotic lymph nodes within the right axilla.    ABDOMEN AND PELVIS:    LIVER: Small hepatic cysts. Multiple hypodense lesions measuring up to 2 cm within the liver parenchyma likely metastatic disease. Nodular contour of the liver.  BILE DUCTS: Mild biliary ductal dilatation.  GALLBLADDER: Not visualized.  SPLEEN: Within normal limits.  PANCREAS: Within normal limits.  ADRENALS: Within normal limits.  KIDNEYS/URETERS: Within normal limits.    BLADDER: Wall thickening/irregularity  REPRODUCTIVE ORGANS: Hysterectomy.    BOWEL: No bowel obstruction. Appendix not visualized.  PERITONEUM: Trace fluid within the cul-de-sac.  VESSELS: Thrombosis within left femoral vein extending into the IVC. The abdominal aorta and its branches are patent.  RETROPERITONEUM/LYMPH NODES: No lymphadenopathy.  ABDOMINAL WALL: Within normal limits.  BONES: Hypodense lesions with cortical destruction within the left 2nd and 3rd ribs likely metastatic. Lucent bone lesions within the bilateral iliac and femoral bones are indeterminant.    IMPRESSION:    Thrombosis within left femoral vein extending into the IVC. No pulmonary embolus.    Multiple hypodense lesions measuring up to 2 cm within the liver parenchyma likely metastatic disease.    Hypodense lesions within the left 2nd and 3rd ribs with cortical destruction likely metastatic. Lucent bone lesions within the bilateral iliac and femoral bones are indeterminant. Further evaluation can be performed with PET-CT or bone scan.    Right breast skin thickening and irregular asymmetric subareolar density. 2.7 x 1.4 cm necrotic lymph nodes within the right axilla. Suspicious for breast cancer. Recommend further evaluation with mammogram/ultrasound.    Wall thickening/irregularity of the urinary bladder. Recommendfurther evaluation with aortogram or cystoscopy.    Indeterminant 3 mm right upper lobe nodule. Recommend follow-up chest CT in 3 months.      These findings were discussed with Dr. FLORES, on 10/10/2020 7:43 PM with read back.    < end of copied text >      Imaging Personally Reviewed:  YES  Consultant(s) Notes Reviewed:   YES  Care Discussed with Consultants : Hem/onc, nephro    Plan of care was discussed with patient and /or primary care giver; all questions and concerns were addressed and care was aligned with patient's wishes.

## 2020-10-13 NOTE — PROGRESS NOTE ADULT - ASSESSMENT
HYPERCALCEMIA: This is associate with bony lesions  and hepatic lesions, likely mets PO4 is low so possible  PTH related peptide induced.  However, may also be due to direct bone mets. Mild DALY associated.  - Hydrate with NS at 100 ml/hour.  - Follow up BMP.  - No Furosemide.  - PTH and PTHrP levels.  - Planned for Zometa today  per Oncology.     Hypophosphatemia  Monitor serum PO4  replete as needed

## 2020-10-13 NOTE — CONSULT NOTE ADULT - SUBJECTIVE AND OBJECTIVE BOX
HPI: 89 y/o F pt with a significant PMHx of breast cancer(finished radiotherapy in July)  HTN , questionable diabetes and no significant PSHx presents to the ED with c/o generalized fatigue. Patient states extremely tired, decreased appetite, unable to walk, since 1 week associated with significant weight loss since a month. Pt states living with her son, had labs done yesterday which showed that her calcium and liver enzyme levels are elevated. Patient denies any syncope, fever, chills, chest pain, shortness of breath, abdominal pain, paresthesia, numbness or any other complains. CT abdomen and Chest with IV contrast shows metastatic lesions in liver, lungs and osteolytic lesions in spine, Clots in femoral vein extending up to IVC.    VASCULAR CONSULT:  Patient was seen and examined at bedside for vascular consult due to CT findings of L femoral vein extending to IVC. Patient denied hx of vascular disease. She states she lives at home with son, unable to walk. Patient offers no other complaints. Denied chest pain or shortness of breathe.     PAST MEDICAL & SURGICAL HISTORY:  Vitamin D deficiency  Gout  Diabetes mellitus  Cholecystitis  Arthritis  Hyperlipidemia  Hypertension  History of cholecystectomy    MEDICATIONS  (STANDING):  amLODIPine   Tablet 5 milliGRAM(s) Oral daily  cefTRIAXone   IVPB      cefTRIAXone   IVPB 1000 milliGRAM(s) IV Intermittent every 24 hours  enoxaparin Injectable 80 milliGRAM(s) SubCutaneous every 12 hours  influenza   Vaccine 0.5 milliLiter(s) IntraMuscular once  sodium chloride 0.9%. 1000 milliLiter(s) (100 mL/Hr) IV Continuous <Continuous>    MEDICATIONS  (PRN):  magnesium hydroxide Suspension 30 milliLiter(s) Oral daily PRN Constipation    Allergies  Benadryl (Swelling)  Intolerances    Vital Signs Last 24 Hrs  T(C): 36.9 (13 Oct 2020 14:54), Max: 37.1 (13 Oct 2020 05:05)  T(F): 98.5 (13 Oct 2020 14:54), Max: 98.7 (13 Oct 2020 05:05)  HR: 77 (13 Oct 2020 14:54) (70 - 77)  BP: 150/80 (13 Oct 2020 14:54) (138/82 - 150/80)  BP(mean): --  RR: 17 (13 Oct 2020 14:54) (16 - 17)  SpO2: 100% (13 Oct 2020 14:54) (94% - 100%)    Physical:  Gen: A&Ox3. NAD  Chest: respirations nonlabored  Lower extremities: Peripheral pulses intact b/l. Sensation intact b/l. Skin warm to touch b/l. No calf tenderness. No edema.    LABS:                        12.8   7.58  )-----------( 224      ( 13 Oct 2020 07:27 )             37.4              10-13    135  |  105  |  9   ----------------------------<  74  5.0   |  24  |  0.86    Ca    11.9<H>      13 Oct 2020 07:27    TPro  6.3  /  Alb  2.1<L>  /  TBili  1.0  /  DBili  x   /  AST  137<H>  /  ALT  67<H>  /  AlkPhos  161<H>  10-12            PTT - ( 12 Oct 2020 09:59 )  PTT:146.8 sec      RADIOLOGY & ADDITIONAL STUDIES:  < from: CT Abdomen and Pelvis w/ IV Cont (10.10.20 @ 19:05) >    EXAM:  CT ABDOMEN AND PELVIS IC                          EXAM:  CT CHEST IC                          PROCEDURE DATE:  10/10/2020      INTERPRETATION:  CLINICAL INFORMATION: Hypercalcemia, fatigue, evaluate for metastases    COMPARISON: None.    PROCEDURE:  CT of the Chest, Abdomen and Pelvis was performed with intravenous contrast.  Intravenous contrast: 90 ml Omnipaque 350. 10 ml discarded.  Oral contrast: None.  Sagittal and coronal reformats were performed.    FINDINGS:    CHEST:    LUNGS AND LARGE AIRWAYS: Patent central airways. Indeterminant 3 mm right upper lobe nodule (series 2 image 46). Small calcified granuloma within the right upper lobe. Mild subpleural scarring/linear atelectasis within the right lower lobe.    PLEURA: No pleural effusion.    VESSELS: No pulmonary embolus. No aortic aneurysm or dissection.    HEART: Heart size is normal. No pericardial effusion. Mild coronary calcifications.    MEDIASTINUM AND CATALINO: Borderline mediastinal lymph nodes includin.5 cm short axis left lower part tracheal lymph node. 1.3 cm short axis subcarinal lymph node.    CHEST WALL AND LOWER NECK: Tiny nodules within the thyroid gland. Right breast skin thickening and irregular asymmetric subareolar density. 2.7 x 1.4 cm necrotic lymph nodes within the right axilla.    VESSELS: Thrombosis within left femoral vein extending into the IVC. The abdominal aorta and its branches are patent.    IMPRESSION:    Thrombosis within left femoral vein extending into the IVC. No pulmonary embolus.

## 2020-10-13 NOTE — PROGRESS NOTE ADULT - PROBLEM SELECTOR PLAN 2
Hypercalcemia due to bone mets-slowly improving  -Admitted with Calcium 12.3->11.2  -Nephro Dr. cohen following  -Cont IVF hydration NS@100ml/hr  -If no response start Zometa per hem/onc  -f/u PTH and PTHrP levels  -f/u BMP Hypercalcemia due to bone mets-slowly improving  -Admitted with Calcium 12.3->11.2->11.9  -Nephro Dr. cohen following  -Cont IVF hydration NS@100ml/hr  -Zometa 4mg IV x 1 dose per hem/onc  -f/u PTH and PTHrP levels  -f/u BMP

## 2020-10-13 NOTE — PROGRESS NOTE ADULT - ASSESSMENT
89 y/o F pt with a significant PMHx of breast cancer(finished radiotherapy in July)  HTN and no significant PSHx presents to the ED with c/o generalized fatigue  Admitting for failure to thrive and metastatic cancer evaluation     Problem/Plan - 1:  ·  Problem: Fatigue, unspecified type.  Plan: Multifactorial . Improving. PT helping.      Problem/Plan - 2:  ·  Problem: UTI (urinary tract infection).  Plan: - -SEc to E coli .   IV Rocephin and will change to PO ceftin to complete 1 week Rx.      Problem/Plan - 3:  ·  Problem: Metastatic breast carcinoma.  Plan: Oncology helping and planning Bx outpt as risky to stop AC for now.      Problem/Plan - 4:  ·  Problem: Hypercalcemia.  Plan: Patient has Calcium 12.3. Improving.    corrected calcium 13.9  -Most likely due to malignancy     Problem/Plan - 5:  ·  Problem: DVT (deep venous thrombosis).  Plan: Thrombosis within left femoral vein extending into the IVC. No pulmonary embolus.  -On Lovenox.   - Monitor PTT.      Problem/Plan - 6:  Problem: Hypertension. Plan: Patient takes Amlodipine 5mg   Will continue with parameters   Dash diet  f.u A1C, Lipid profile.     Problem/Plan - 7:  ·  Problem: Prophylactic measure.  Plan:   Improve score 4  Heparin drip for DVT.     Disposition : DC planning to have outpt BX and RX with her oncologist.

## 2020-10-14 ENCOUNTER — TRANSCRIPTION ENCOUNTER (OUTPATIENT)
Age: 85
End: 2020-10-14

## 2020-10-14 DIAGNOSIS — Z51.5 ENCOUNTER FOR PALLIATIVE CARE: ICD-10-CM

## 2020-10-14 DIAGNOSIS — E43 UNSPECIFIED SEVERE PROTEIN-CALORIE MALNUTRITION: ICD-10-CM

## 2020-10-14 DIAGNOSIS — R53.81 OTHER MALAISE: ICD-10-CM

## 2020-10-14 LAB
-  AMIKACIN: SIGNIFICANT CHANGE UP
-  AMOXICILLIN/CLAVULANIC ACID: SIGNIFICANT CHANGE UP
-  AMPICILLIN/SULBACTAM: SIGNIFICANT CHANGE UP
-  AMPICILLIN: SIGNIFICANT CHANGE UP
-  AZTREONAM: SIGNIFICANT CHANGE UP
-  CEFAZOLIN: SIGNIFICANT CHANGE UP
-  CEFEPIME: SIGNIFICANT CHANGE UP
-  CEFOXITIN: SIGNIFICANT CHANGE UP
-  CEFTRIAXONE: SIGNIFICANT CHANGE UP
-  CIPROFLOXACIN: SIGNIFICANT CHANGE UP
-  ERTAPENEM: SIGNIFICANT CHANGE UP
-  GENTAMICIN: SIGNIFICANT CHANGE UP
-  IMIPENEM: SIGNIFICANT CHANGE UP
-  LEVOFLOXACIN: SIGNIFICANT CHANGE UP
-  MEROPENEM: SIGNIFICANT CHANGE UP
-  NITROFURANTOIN: SIGNIFICANT CHANGE UP
-  PIPERACILLIN/TAZOBACTAM: SIGNIFICANT CHANGE UP
-  TIGECYCLINE: SIGNIFICANT CHANGE UP
-  TOBRAMYCIN: SIGNIFICANT CHANGE UP
-  TRIMETHOPRIM/SULFAMETHOXAZOLE: SIGNIFICANT CHANGE UP
ALBUMIN SERPL ELPH-MCNC: 2.2 G/DL — LOW (ref 3.5–5)
ALP SERPL-CCNC: 190 U/L — HIGH (ref 40–120)
ALT FLD-CCNC: 76 U/L DA — HIGH (ref 10–60)
ANION GAP SERPL CALC-SCNC: 8 MMOL/L — SIGNIFICANT CHANGE UP (ref 5–17)
AST SERPL-CCNC: 156 U/L — HIGH (ref 10–40)
BILIRUB SERPL-MCNC: 1.4 MG/DL — HIGH (ref 0.2–1.2)
BUN SERPL-MCNC: 8 MG/DL — SIGNIFICANT CHANGE UP (ref 7–18)
CALCIUM SERPL-MCNC: 10.5 MG/DL — SIGNIFICANT CHANGE UP (ref 8.4–10.5)
CHLORIDE SERPL-SCNC: 103 MMOL/L — SIGNIFICANT CHANGE UP (ref 96–108)
CO2 SERPL-SCNC: 22 MMOL/L — SIGNIFICANT CHANGE UP (ref 22–31)
CREAT SERPL-MCNC: 0.71 MG/DL — SIGNIFICANT CHANGE UP (ref 0.5–1.3)
CULTURE RESULTS: SIGNIFICANT CHANGE UP
GLUCOSE SERPL-MCNC: 86 MG/DL — SIGNIFICANT CHANGE UP (ref 70–99)
HCT VFR BLD CALC: 42.4 % — SIGNIFICANT CHANGE UP (ref 34.5–45)
HGB BLD-MCNC: 14.3 G/DL — SIGNIFICANT CHANGE UP (ref 11.5–15.5)
MAGNESIUM SERPL-MCNC: 1.4 MG/DL — LOW (ref 1.6–2.6)
MCHC RBC-ENTMCNC: 28.4 PG — SIGNIFICANT CHANGE UP (ref 27–34)
MCHC RBC-ENTMCNC: 33.7 GM/DL — SIGNIFICANT CHANGE UP (ref 32–36)
MCV RBC AUTO: 84.1 FL — SIGNIFICANT CHANGE UP (ref 80–100)
METHOD TYPE: SIGNIFICANT CHANGE UP
NRBC # BLD: 0 /100 WBCS — SIGNIFICANT CHANGE UP (ref 0–0)
ORGANISM # SPEC MICROSCOPIC CNT: SIGNIFICANT CHANGE UP
PLATELET # BLD AUTO: 217 K/UL — SIGNIFICANT CHANGE UP (ref 150–400)
POTASSIUM SERPL-MCNC: 4.1 MMOL/L — SIGNIFICANT CHANGE UP (ref 3.5–5.3)
POTASSIUM SERPL-SCNC: 4.1 MMOL/L — SIGNIFICANT CHANGE UP (ref 3.5–5.3)
PROT SERPL-MCNC: 6.8 G/DL — SIGNIFICANT CHANGE UP (ref 6–8.3)
RBC # BLD: 5.04 M/UL — SIGNIFICANT CHANGE UP (ref 3.8–5.2)
RBC # FLD: 16.3 % — HIGH (ref 10.3–14.5)
SODIUM SERPL-SCNC: 133 MMOL/L — LOW (ref 135–145)
SPECIMEN SOURCE: SIGNIFICANT CHANGE UP
WBC # BLD: 7.28 K/UL — SIGNIFICANT CHANGE UP (ref 3.8–10.5)
WBC # FLD AUTO: 7.28 K/UL — SIGNIFICANT CHANGE UP (ref 3.8–10.5)

## 2020-10-14 PROCEDURE — 99223 1ST HOSP IP/OBS HIGH 75: CPT

## 2020-10-14 RX ORDER — ENOXAPARIN SODIUM 100 MG/ML
80 INJECTION SUBCUTANEOUS
Qty: 4800 | Refills: 0
Start: 2020-10-14 | End: 2020-11-12

## 2020-10-14 RX ORDER — MAGNESIUM SULFATE 500 MG/ML
2 VIAL (ML) INJECTION ONCE
Refills: 0 | Status: COMPLETED | OUTPATIENT
Start: 2020-10-14 | End: 2020-10-14

## 2020-10-14 RX ORDER — ENOXAPARIN SODIUM 100 MG/ML
80 INJECTION SUBCUTANEOUS
Qty: 2400 | Refills: 0
Start: 2020-10-14 | End: 2020-10-28

## 2020-10-14 RX ADMIN — SODIUM CHLORIDE 100 MILLILITER(S): 9 INJECTION INTRAMUSCULAR; INTRAVENOUS; SUBCUTANEOUS at 05:26

## 2020-10-14 RX ADMIN — CEFTRIAXONE 100 MILLIGRAM(S): 500 INJECTION, POWDER, FOR SOLUTION INTRAMUSCULAR; INTRAVENOUS at 21:08

## 2020-10-14 RX ADMIN — Medication 50 GRAM(S): at 09:00

## 2020-10-14 RX ADMIN — ENOXAPARIN SODIUM 80 MILLIGRAM(S): 100 INJECTION SUBCUTANEOUS at 05:22

## 2020-10-14 RX ADMIN — ENOXAPARIN SODIUM 80 MILLIGRAM(S): 100 INJECTION SUBCUTANEOUS at 18:05

## 2020-10-14 RX ADMIN — AMLODIPINE BESYLATE 5 MILLIGRAM(S): 2.5 TABLET ORAL at 05:22

## 2020-10-14 NOTE — CHART NOTE - NSCHARTNOTEFT_GEN_A_CORE
Pt.'s son was educated by nursing staff how to administer Lovenox.  Rx for Lovenox 80mg SQ Q12hrs for 30 days sent to pt.'s [facility].

## 2020-10-14 NOTE — CONSULT NOTE ADULT - PROBLEM SELECTOR RECOMMENDATION 3
will need a rebiopsy to see any change of characteristics  will discuss with family if they want to go through this.
Likely 2/2 malignancy.  S/p fall with left knee injury.  Pt also reports feeling weak, unable to care for herself.   Pt jeimy

## 2020-10-14 NOTE — CHART NOTE - NSCHARTNOTEFT_GEN_A_CORE
Reassessment:   88yFemalePatient is a 88y old  Female who presents with a chief complaint of Generalized weakness (14 Oct 2020 09:57)      Factors impacting intake: [ ] none [ ] nausea  [ ] vomiting [ ] diarrhea [ ] constipation  [ ]chewing problems [ ] swallowing issues  [ X] other: Fatigue/weakness, metastatic cancer, Vit. D deficiency, diabetes, gout     Diet Presciption: Diet, Consistent Carbohydrate w/Evening Snack:   DASH/TLC {Sodium & Cholesterol Restricted}  Supplement Feeding Modality:  Oral  Glucerna Shake Cans or Servings Per Day:  1       Frequency:  Two Times a day (10-11-20 @ 18:06)    Intake: Visited pt. alert but weak, reports of "some" improvement in her po intake, consumed ~40% of breakfast meal & tolerating, drinking ~4 oz of Glucerna shake at mealtime, no reports of GI distress, encourage po intake with meal set-up, rec. c/w diet as ordered & Glucerna Shake 1can bid as medically feasible (440kcal, 20g protein). RD available     Daily Weight in k (14 Oct 2020 05:00)  Weight in k.1 (11 Oct 2020 17:23)  Weight in k.8 (11 Oct 2020 05:31)    % Weight Change: wt. variation noted     Pertinent Medications: MEDICATIONS  (STANDING):  amLODIPine   Tablet 5 milliGRAM(s) Oral daily  cefTRIAXone   IVPB      cefTRIAXone   IVPB 1000 milliGRAM(s) IV Intermittent every 24 hours  enoxaparin Injectable 80 milliGRAM(s) SubCutaneous every 12 hours  influenza   Vaccine 0.5 milliLiter(s) IntraMuscular once  sodium chloride 0.9%. 1000 milliLiter(s) (100 mL/Hr) IV Continuous <Continuous>    MEDICATIONS  (PRN):  magnesium hydroxide Suspension 30 milliLiter(s) Oral daily PRN Constipation    Pertinent Labs: 10-14 Na133 mmol/L<L> Glu 86 mg/dL K+ 4.1 mmol/L Cr  0.71 mg/dL BUN 8 mg/dL 10-11 Phos 2.0 mg/dL<L> 10-14 Alb 2.2 g/dL<L> 10-11 Chol 94 mg/dL LDL 31 mg/dL HDL 53 mg/dL Trig 50 mg/dL    CAPILLARY BLOOD GLUCOSE    Skin: Intact    Estimated Needs:   [X ] no change since previous assessment  [ ] recalculated:     Previous Nutrition Diagnosis:   [ ] Inadequate Energy Intake [ ]Inadequate Oral Intake [ ] Excessive Energy Intake   [ ] Underweight [ ] Increased Nutrient Needs [ ] Overweight/Obesity   [ ] Altered GI Function [ ] Unintended Weight Loss [ ] Food & Nutrition Related Knowledge Deficit [Severe] Malnutrition     Nutrition Diagnosis is [X ] ongoing  [ ] resolved [ ] not applicable     New Nutrition Diagnosis: [ ] not applicable     Interventions: To meet nutrition needs   Recommend  [ ] Change Diet To:  [ ] Nutrition Supplement  [ ] Nutrition Support  [X ] Other: Add MVI/minerals daily as medically feasible    Monitoring and Evaluation:   [X ] PO intake [ x ] Tolerance to diet prescription [ x ] weights [ x ] labs[ x ] follow up per protocol  [ ] other:

## 2020-10-14 NOTE — DISCHARGE NOTE PROVIDER - NSDCMRMEDTOKEN_GEN_ALL_CORE_FT
amLODIPine 10 mg oral tablet: 1 tab(s) orally once a day   amLODIPine 10 mg oral tablet: 1 tab(s) orally once a day  enoxaparin 80 mg/0.8 mL injectable solution: 80 milligram(s) subcutaneously every 12 hours    amLODIPine 10 mg oral tablet: 1 tab(s) orally once a day  cefuroxime 250 mg oral tablet: 1 tab(s) orally 2 times a day   enoxaparin 80 mg/0.8 mL injectable solution: 80 milligram(s) subcutaneously every 12 hours   magnesium hydroxide 8% oral suspension: 30 milliliter(s) orally once a day, As needed, Constipation

## 2020-10-14 NOTE — CHART NOTE - NSCHARTNOTEFT_GEN_A_CORE
Spoke to son Wild on phone, updated him in details re pt.'s condition and plan of care.  I discussed with him Lovenox injections which he will need to administer to his mom if he agrees.  Son states he will and will come to hospital to be taught how to administer Lovenox.  Pt. was seen by PT and recommended [facility] however son is adamant about taking her home even though she is very weak.    Pt.'s son would like copies of all records upon discharge.  Discharge to home likely tomorrow, CM following.

## 2020-10-14 NOTE — CONSULT NOTE ADULT - PROBLEM SELECTOR RECOMMENDATION 4
Met with the pt at the bedside, discussed her oncology history. She deferred to her son Wild Sharma for further discussion.    Spoke with Mr. Sharma, he stated his mother is unaware of the recurrent cancer and he does not want PC involvement at this time.    Palliative care will sign off. Please reconsult if needed.

## 2020-10-14 NOTE — CHART NOTE - NSCHARTNOTEFT_GEN_A_CORE
Reassessment:   88yFemalePatient is a 88y old  Female who presents with a chief complaint of Generalized weakness (14 Oct 2020 09:57)      Factors impacting intake: [ ] none [ ] nausea  [ ] vomiting [ ] diarrhea [ ] constipation  [ ]chewing problems [ ] swallowing issues  [X ] other: fatigue, metastatic cancer, Vit. D, gout, diabetes mellitus      Diet Presciption: Diet, Consistent Carbohydrate w/Evening Snack:   DASH/TLC {Sodium & Cholesterol Restricted}  Supplement Feeding Modality:  Oral  Glucerna Shake Cans or Servings Per Day:  1       Frequency:  Two Times a day (10-11-20 @ 18:06)    Intake: Patient seen & assessment done on 10/11/20 by RD    Daily Weight in k (14 Oct 2020 05:00)  Weight in k.1 (11 Oct 2020 17:23)  Weight in k.8 (11 Oct 2020 05:31)    % Weight Change    Pertinent Medications: MEDICATIONS  (STANDING):  amLODIPine   Tablet 5 milliGRAM(s) Oral daily  cefTRIAXone   IVPB      cefTRIAXone   IVPB 1000 milliGRAM(s) IV Intermittent every 24 hours  enoxaparin Injectable 80 milliGRAM(s) SubCutaneous every 12 hours  influenza   Vaccine 0.5 milliLiter(s) IntraMuscular once  sodium chloride 0.9%. 1000 milliLiter(s) (100 mL/Hr) IV Continuous <Continuous>    MEDICATIONS  (PRN):  magnesium hydroxide Suspension 30 milliLiter(s) Oral daily PRN Constipation    Pertinent Labs: 10-14 Na133 mmol/L<L> Glu 86 mg/dL K+ 4.1 mmol/L Cr  0.71 mg/dL BUN 8 mg/dL 10-11 Phos 2.0 mg/dL<L> 10-14 Alb 2.2 g/dL<L> 10-11 Chol 94 mg/dL LDL 31 mg/dL HDL 53 mg/dL Trig 50 mg/dL     CAPILLARY BLOOD GLUCOSE        Skin:     Estimated Needs:   [ ] no change since previous assessment  [ ] recalculated:     Previous Nutrition Diagnosis:   [ ] Inadequate Energy Intake [ ]Inadequate Oral Intake [ ] Excessive Energy Intake   [ ] Underweight [ ] Increased Nutrient Needs [ ] Overweight/Obesity   [ ] Altered GI Function [ ] Unintended Weight Loss [ ] Food & Nutrition Related Knowledge Deficit [ ] Malnutrition     Nutrition Diagnosis is [ ] ongoing  [ ] resolved [ ] not applicable     New Nutrition Diagnosis: [ ] not applicable       Interventions:   Recommend  [ ] Change Diet To:  [ ] Nutrition Supplement  [ ] Nutrition Support  [ ] Other:     Monitoring and Evaluation:   [ ] PO intake [ x ] Tolerance to diet prescription [ x ] weights [ x ] labs[ x ] follow up per protocol  [ ] other:

## 2020-10-14 NOTE — PROGRESS NOTE ADULT - PROBLEM SELECTOR PLAN 3
Albumin 2.2 likely due to metastatic disease  -Reports appetite somewhat decreased, + wt. loss  -Nutrition consult  -Palliative consulted

## 2020-10-14 NOTE — CONSULT NOTE ADULT - ATTENDING COMMENTS
88 y F with metastatic breast CA, s/p xrt, immunotherapy adm for weakness, hypercalcemia, elevated LFTs. CT shows bone and liver mets, femoral DVT. Alert, NAD. Defers decisionmaking to son, he was not agreeable to discuss GOC w Palliative team. Please reconsult as needed.

## 2020-10-14 NOTE — PROGRESS NOTE ADULT - ASSESSMENT
87 y/o F pt with a significant PMHx of breast cancer(finished radiotherapy in July)  HTN , questionable diabetes and no significant PSHx presents to the ED with c/o generalized fatigue. Patient states extremely tired, decreased appetite, unable to walk, since 1 week associated with significant weight loss since a month. Pt states living with her son, had labs done yesterday which showed that her calcium and liver enzyme levels are elevated.   Patient denies any syncope, fever, chills, chest pain, shortness of breath, abdominal pain, paresthesia, numbness or any other complains.  Pt. with CT A/P/C suspicious for metastatic Ca associated with hypercalcemia (Ca 12.3) and increased generalized weakness.  Pt. also with Thrombosis within left femoral vein extending into the IVC. No pulmonary embolus.  Pt. admitted to medicine, followed by nephro and hem/onc, was started on Heparin gtt. which was transitioned to full dose Lovenox.  Pt. was also noted with UTI, treated with IV Rocephin.  Pt. seen and examined, continues to c/o generalized weakness, denies pain.  Pt. is HD stable, afebrile with no leukocytosis.  Her Ca is slowly improving, 11.2 today.    10/13-Persisting hypercalcemia 11.9 today, received Zometa 4mg x 1 dose per Dr. Gold, will monitor effect, IVF hydration continues.  Vascular consulted for evaluation of fem thrombus for possible further interventions  Pt. will likely need rebiopsy per Dr. Gold.  Currently on full dose Lovenox, will likely biopsy as OP  Discharge planning pending PT evaluation, pt. will likely be medically stable for discharge Wed/Thurs.

## 2020-10-14 NOTE — PROGRESS NOTE ADULT - PROBLEM SELECTOR PLAN 2
Hypercalcemia due to bone mets-slowly improving  -Admitted with Calcium 12.3->11.2->11.9->10.5  -Nephro Dr. cohen following  -Cont IVF hydration NS@100ml/hr  -Zometa 4mg IV x 1 dose per hem/onc on 10/13  -f/u PTH and PTHrP levels  -f/u BMP

## 2020-10-14 NOTE — PROGRESS NOTE ADULT - SUBJECTIVE AND OBJECTIVE BOX
INTERVAL HPI/OVERNIGHT EVENTS: Seen and examined earlier today . No new concerns .   Vital Signs Last 24 Hrs  T(C): 37 (14 Oct 2020 05:00), Max: 37 (13 Oct 2020 20:25)  T(F): 98.6 (14 Oct 2020 05:00), Max: 98.6 (13 Oct 2020 20:25)  HR: 86 (14 Oct 2020 05:00) (76 - 86)  BP: 162/93 (14 Oct 2020 05:00) (150/80 - 162/93)  BP(mean): --  RR: 18 (14 Oct 2020 05:00) (17 - 18)  SpO2: 94% (14 Oct 2020 05:00) (94% - 100%)  I&O's Summary    MEDICATIONS  (STANDING):  amLODIPine   Tablet 5 milliGRAM(s) Oral daily  cefTRIAXone   IVPB      cefTRIAXone   IVPB 1000 milliGRAM(s) IV Intermittent every 24 hours  enoxaparin Injectable 80 milliGRAM(s) SubCutaneous every 12 hours  influenza   Vaccine 0.5 milliLiter(s) IntraMuscular once  sodium chloride 0.9%. 1000 milliLiter(s) (100 mL/Hr) IV Continuous <Continuous>    MEDICATIONS  (PRN):  magnesium hydroxide Suspension 30 milliLiter(s) Oral daily PRN Constipation    LABS:                        14.3   7.28  )-----------( 217      ( 14 Oct 2020 07:10 )             42.4     10-14    133<L>  |  103  |  8   ----------------------------<  86  4.1   |  22  |  0.71    Ca    10.5      14 Oct 2020 07:10  Mg     1.4     10-14    TPro  6.8  /  Alb  2.2<L>  /  TBili  1.4<H>  /  DBili  x   /  AST  156<H>  /  ALT  76<H>  /  AlkPhos  190<H>  10-14        CAPILLARY BLOOD GLUCOSE              REVIEW OF SYSTEMS:  CONSTITUTIONAL: No fever, weight loss, or fatigue  EYES: No eye pain, visual disturbances, or discharge  ENMT:  No difficulty hearing, tinnitus, vertigo; No sinus or throat pain  NECK: No pain or stiffness  RESPIRATORY: No cough, wheezing, chills or hemoptysis; No shortness of breath  CARDIOVASCULAR: No chest pain, palpitations, dizziness, or leg swelling  GASTROINTESTINAL: No abdominal or epigastric pain. No nausea, vomiting, or hematemesis; No diarrhea or constipation. No melena or hematochezia.  GENITOURINARY: No dysuria, frequency, hematuria, or incontinence  NEUROLOGICAL: No headaches, memory loss, loss of strength, numbness, or tremors      Consultant(s) Notes Reviewed:  [x ] YES  [ ] NO    PHYSICAL EXAM:  GENERAL: NAD, Obese   HEAD:  Atraumatic, Normocephalic  EYES: EOMI, PERRLA, conjunctiva and sclera clear  ENMT: No tonsillar erythema, exudates, or enlargement; Moist mucous membranes, Good dentition, No lesions  NECK: Supple, No JVD, Normal thyroid  NERVOUS SYSTEM:  Alert & Oriented X3, No focal deficit   CHEST/LUNG: Good air entry bilateral with no  rales, rhonchi, wheezing, or rubs  HEART: Regular rate and rhythm; No murmurs, rubs, or gallops  ABDOMEN: Soft, Nontender, Nondistended; Bowel sounds present  EXTREMITIES:  2+ Peripheral Pulses, No clubbing, cyanosis, or edema    Care Discussed with Consultants/Other Providers [ x] YES  [ ] NO

## 2020-10-14 NOTE — PROGRESS NOTE ADULT - ASSESSMENT
87 y/o F pt with a significant PMHx of breast cancer(finished radiotherapy in July)  HTN and no significant PSHx presents to the ED with c/o generalized fatigue  Admitting for failure to thrive and metastatic cancer evaluation     Problem/Plan - 1:  ·  Problem: Fatigue, unspecified type.  Plan: Multifactorial . Improving. PT helping.      Problem/Plan - 2:  ·  Problem: UTI (urinary tract infection).  Plan: - -SEc to E coli .   IV Rocephin and will change to PO ceftin to complete 1 week Rx.      Problem/Plan - 3:  ·  Problem: Metastatic breast carcinoma.  Plan: Oncology helping and planning Bx outpt as risky to stop AC for now.      Problem/Plan - 4:  ·  Problem: Hypercalcemia.  Plan: Patient has Calcium 12.3. Improving.    corrected calcium 13.9  -Most likely due to malignancy     Problem/Plan - 5:  ·  Problem: DVT (deep venous thrombosis).  Plan: Thrombosis within left femoral vein extending into the IVC. No pulmonary embolus.  -On Lovenox. can be dcd on lovenox or NOAC.   - Monitor PTT.      Problem/Plan - 6:  Problem: Hypertension. Plan: Patient takes Amlodipine 5mg   Will continue with parameters   Dash diet  f.u A1C, Lipid profile.     Problem/Plan - 7:  ·  Problem: Abnormal LFT .  Plan: Sec to metastatic disease.     Disposition : DC planning to have outpt BX and RX with her own oncologist.

## 2020-10-14 NOTE — PROGRESS NOTE ADULT - PROBLEM SELECTOR PLAN 6
CT A/P shows Thrombosis within left femoral vein extending into the IVC. No pulmonary embolus.  -Was started on Heparin gtt, now switched to full dose Lovenox  -Vascular consulted, no interventions indicated at this time

## 2020-10-14 NOTE — PROGRESS NOTE ADULT - SUBJECTIVE AND OBJECTIVE BOX
NP Note discussed with  Primary Attending    Patient is a 88y old  Female who presents with a chief complaint of Generalized weakness (14 Oct 2020 12:06)      INTERVAL HPI/OVERNIGHT EVENTS: no new complaints    MEDICATIONS  (STANDING):  amLODIPine   Tablet 5 milliGRAM(s) Oral daily  cefTRIAXone   IVPB      cefTRIAXone   IVPB 1000 milliGRAM(s) IV Intermittent every 24 hours  enoxaparin Injectable 80 milliGRAM(s) SubCutaneous every 12 hours  influenza   Vaccine 0.5 milliLiter(s) IntraMuscular once  sodium chloride 0.9%. 1000 milliLiter(s) (100 mL/Hr) IV Continuous <Continuous>    MEDICATIONS  (PRN):  magnesium hydroxide Suspension 30 milliLiter(s) Oral daily PRN Constipation      __________________________________________________  REVIEW OF SYSTEMS:  c/o weakness    CONSTITUTIONAL: No fever,   EYES: no acute visual disturbances  NECK: No pain or stiffness  RESPIRATORY: No cough; No shortness of breath  CARDIOVASCULAR: No chest pain, no palpitations  GASTROINTESTINAL: No pain. No nausea or vomiting; No diarrhea   NEUROLOGICAL: No headache or numbness, no tremors  MUSCULOSKELETAL: No joint pain, no muscle pain  GENITOURINARY: no dysuria, no frequency, no hesitancy  PSYCHIATRY: no depression , no anxiety  ALL OTHER  ROS negative        Vital Signs Last 24 Hrs  T(C): 37 (14 Oct 2020 05:00), Max: 37 (13 Oct 2020 20:25)  T(F): 98.6 (14 Oct 2020 05:00), Max: 98.6 (13 Oct 2020 20:25)  HR: 86 (14 Oct 2020 05:00) (76 - 86)  BP: 162/93 (14 Oct 2020 05:00) (150/80 - 162/93)  BP(mean): --  RR: 18 (14 Oct 2020 05:00) (17 - 18)  SpO2: 94% (14 Oct 2020 05:00) (94% - 100%)    ________________________________________________  PHYSICAL EXAM:  well developed, well groomed  GENERAL: NAD  HEENT: Normocephalic;  conjunctivae and sclerae clear; moist mucous membranes;   NECK : supple  CHEST/LUNG: Clear to auscultation bilaterally with good air entry   HEART: S1 S2  regular; no murmurs, gallops or rubs  ABDOMEN: Soft, Nontender, Nondistended; Bowel sounds present  EXTREMITIES: no cyanosis; no edema; no calf tenderness  SKIN: warm and dry; no rash  NERVOUS SYSTEM:  Awake and alert; confused at times but able to reorient    _________________________________________________  LABS:                        14.3   7.28  )-----------( 217      ( 14 Oct 2020 07:10 )             42.4     10-14    133<L>  |  103  |  8   ----------------------------<  86  4.1   |  22  |  0.71    Ca    10.5      14 Oct 2020 07:10  Mg     1.4     10-14    TPro  6.8  /  Alb  2.2<L>  /  TBili  1.4<H>  /  DBili  x   /  AST  156<H>  /  ALT  76<H>  /  AlkPhos  190<H>  10-14        CAPILLARY BLOOD GLUCOSE    RADIOLOGY & ADDITIONAL TESTS:      CT Head No Cont (10.11.20 @ 03:59) >  EXAM:  CT BRAIN                            PROCEDURE DATE:  10/11/2020          INTERPRETATION:  CT HEAD WITHOUT CONTRAST    INDICATION: 88 years old. Female. metastatic disease H/o breast cancer. Weakness    COMPARISON: None available.    TECHNIQUE: Noncontrast axial CT head was obtained from the skull base to vertex.    FINDINGS:  No acute intracranial hemorrhage, mass effect or midline shift.  No CT evidence of acute large territory vascular infarct.  The ventricles and cortical sulci are within normal limits for age.  Patchy hypodensities in the periventricular white matter are nonspecific, but likely sequela of small vessel ischemic disease.    Small mucus retention cyst/polyp in the left maxillary. The mastoid air cells are well aerated.  No displaced calvarial fracture.    IMPRESSION:  No acute intracranial hemorrhage or mass effect. Further evaluation with MRI may be performed as clinically indicated.    < end of copied text >    CT Abdomen and Pelvis w/ IV Cont (10.10.20 @ 19:05) >  EXAM:  CT ABDOMEN AND PELVIS IC                          EXAM:  CT CHEST IC                            PROCEDURE DATE:  10/10/2020          INTERPRETATION:  CLINICAL INFORMATION: Hypercalcemia, fatigue, evaluate for metastases    COMPARISON: None.    PROCEDURE:  CT of the Chest, Abdomen and Pelvis was performed with intravenous contrast.  Intravenous contrast: 90 ml Omnipaque 350. 10 ml discarded.  Oral contrast: None.  Sagittal and coronal reformats were performed.    FINDINGS:    CHEST:    LUNGS AND LARGE AIRWAYS: Patent central airways. Indeterminant 3 mm right upper lobe nodule (series 2 image 46). Small calcified granuloma within the right upper lobe. Mild subpleural scarring/linear atelectasis within the right lower lobe.    PLEURA: No pleural effusion.    VESSELS: No pulmonary embolus. No aortic aneurysm or dissection.    HEART: Heart size is normal. No pericardial effusion. Mild coronary calcifications.    MEDIASTINUM AND CATALINO: Borderline mediastinal lymph nodes includin.5 cm short axis left lower part tracheal lymph node. 1.3 cm short axis subcarinal lymph node.    CHEST WALL AND LOWER NECK: Tiny nodules within the thyroid gland. Right breast skin thickening and irregular asymmetric subareolar density. 2.7 x 1.4 cm necrotic lymph nodes within the right axilla.    ABDOMEN AND PELVIS:    LIVER: Small hepatic cysts. Multiple hypodense lesions measuring up to 2 cm within the liver parenchyma likely metastatic disease. Nodular contour of the liver.  BILE DUCTS: Mild biliary ductal dilatation.  GALLBLADDER: Not visualized.  SPLEEN: Within normal limits.  PANCREAS: Within normal limits.  ADRENALS: Within normal limits.  KIDNEYS/URETERS: Within normal limits.    BLADDER: Wall thickening/irregularity  REPRODUCTIVE ORGANS: Hysterectomy.    BOWEL: No bowel obstruction. Appendix not visualized.  PERITONEUM: Trace fluid within the cul-de-sac.  VESSELS: Thrombosis within left femoral vein extending into the IVC. The abdominal aorta and its branches are patent.  RETROPERITONEUM/LYMPH NODES: No lymphadenopathy.  ABDOMINAL WALL: Within normal limits.  BONES: Hypodense lesions with cortical destruction within the left 2nd and 3rd ribs likely metastatic. Lucent bone lesions within the bilateral iliac and femoral bones are indeterminant.    IMPRESSION:    Thrombosis within left femoral vein extending into the IVC. No pulmonary embolus.    Multiple hypodense lesions measuring up to 2 cm within the liver parenchyma likely metastatic disease.    Hypodense lesions within the left 2nd and 3rd ribs with cortical destruction likely metastatic. Lucent bone lesions within the bilateral iliac and femoral bones are indeterminant. Further evaluation can be performed with PET-CT or bone scan.    Right breast skin thickening and irregular asymmetric subareolar density. 2.7 x 1.4 cm necrotic lymph nodes within the right axilla. Suspicious for breast cancer. Recommend further evaluation with mammogram/ultrasound.    Wall thickening/irregularity of the urinary bladder. Recommendfurther evaluation with aortogram or cystoscopy.    Indeterminant 3 mm right upper lobe nodule. Recommend follow-up chest CT in 3 months.    < end of copied text >      Xray Chest 2 Views PA/Lat (10.10.20 @ 16:22) >  EXAM:  XR CHEST PA LAT 2V                            PROCEDURE DATE:  10/10/2020          INTERPRETATION:  Exam Date: 10/10/2020 4:22 PM    History: Weakness, fatigue    Technique: Frontal and lateral views of the chest with comparison to  2020    Findings:    The heart is enlarged.  The lungs are grossly clear. The apices and hemidiaphragms are unremarkable. Degenerative changes of the visualized osseous structures.    Impression:    No acute disease    No significant interval change as compared to  2020    < end of copied text >  Imaging Personally Reviewed:  YES/NO    Consultant(s) Notes Reviewed:   YES/ No    Care Discussed with Consultants :     Plan of care was discussed with patient and /or primary care giver; all questions and concerns were addressed and care was aligned with patient's wishes.

## 2020-10-14 NOTE — PROGRESS NOTE ADULT - PROBLEM SELECTOR PLAN 4
Fatigue likely due to met disease superimposed with UTI  - UA +, urine culture grew E. Coli  -Blood Cx NTD  -Persisting hypercalcemia

## 2020-10-14 NOTE — DISCHARGE NOTE PROVIDER - CARE PROVIDER_API CALL
na, na  Follow up with PCP within 1 week  Phone: (   )    -  Fax: (   )    -  Follow Up Time: 1 week    Adali Gold  INTERNAL MEDICINE  [address] [place] [address]  Phone: [phone]  Fax: [phone]  Follow Up Time: 1 week

## 2020-10-14 NOTE — PROGRESS NOTE ADULT - PROBLEM SELECTOR PLAN 9
Pt. is from home, lives with son  Needs PT evaluation  Pt.'s son Wild (HCP) does not want pt. to d/c to [facility]  Palliative care and CM following.  CM following
Pt. is from home, lives with son  Needs PT evaluation to determine disposition  Will likely be medically optimized Wed/Thurs  CM following

## 2020-10-14 NOTE — PROGRESS NOTE ADULT - ASSESSMENT
· Assessment	  88 year old lady who had right breast ca s/p surgery and RT 15 years ago.  She just finished hormonal therapy in July.  Now she has recurrence at right axilla, bones and liver with hypercalcemia    Problem/Recommendation - 1:  Problem: Hypercalcemia. Recommendation: probably from mets  will give zometa.    Problem/Recommendation - 2:  ·  Problem: DVT (deep venous thrombosis).  Recommendation: up to IVC  ?vascular to evaluate for TPA, vascular does not think she needs  on heparin.   no evidence of PE  can change to lovenox or eliquis    Problem/Recommendation - 3:  ·  Problem: Metastatic breast carcinoma.  Recommendation: will need a rebiopsy to see any change of characteristics  will discuss with family if they want to go through this.   she can follow up with her outside oncologist to arrange biopsy as outpt

## 2020-10-14 NOTE — CONSULT NOTE ADULT - PROBLEM SELECTOR RECOMMENDATION 2
up to IVC  ?vascular to evaluate for TPA  on heparin
2/2 malignancy.  bitemporal wasting. Albumin 2.2.   Pt reports too tired to eat.  Diet as tolerated.  Nutrition consult

## 2020-10-14 NOTE — PHYSICAL THERAPY INITIAL EVALUATION ADULT - PERTINENT HX OF CURRENT PROBLEM, REHAB EVAL
patient to ED due to generalized weakness, extremely tired, loss of appetite, difficulty with ambulation

## 2020-10-14 NOTE — CONSULT NOTE ADULT - SUBJECTIVE AND OBJECTIVE BOX
HPI:  87 y/o F pt with a significant PMHx of breast cancer(finished radiotherapy in July)  HTN , questionable diabetes and no significant PSHx presents to the ED with c/o generalized fatigue. Patient states extremely tired, decreased appetite, unable to walk, since 1 week associated with significant weight loss since a month. Pt states living with her son, had labs done yesterday which showed that her calcium and liver enzyme levels are elevated.   Patient denies any syncope, fever, chills, chest pain, shortness of breath, abdominal pain, paresthesia, numbness or any other complains.    ED Course:   CT abdomen and Chest with IV contrast shows metastatic lesions in liver, lungs and osteolytic lesions in spine, Clots in femoral vein extending upto IVC  Vital Signs Last 24 Hrs  T(C): 37.1 (10 Oct 2020 19:45), Max: 37.1 (10 Oct 2020 19:45)  T(F): 98.8 (10 Oct 2020 19:45), Max: 98.8 (10 Oct 2020 19:45)  HR: 82 (10 Oct 2020 19:45) (78 - 82)  BP: 131/81 (10 Oct 2020 19:45) (122/84 - 131/81)  RR: 16 (10 Oct 2020 19:45) (16 - 20)  SpO2: 97% (10 Oct 2020 19:45) (97% - 97%) (10 Oct 2020 20:00)      PAST MEDICAL & SURGICAL HISTORY:  Vitamin D deficiency    Gout    Diabetes mellitus    Cholecystitis    Arthritis    Hyperlipidemia    Hypertension    History of cholecystectomy        SOCIAL HISTORY:    Admitted from:  home assisted living [facility]   Substance abuse history:              Tobacco hx:                  Alcohol hx:              Home Opioid hx:  Shinto:                                    Preferred Language:    Surrogate/HCP/Guardian:            Phone#:    FAMILY HISTORY:  Family history of heart disease (Mother)    Family history of diabetes mellitus (DM) (Mother, Sibling)    Family history of hypertension (Father)      Baseline ADLs (prior to admission):    Allergies    Benadryl (Swelling)    Intolerances      Present Symptoms:   Dyspnea:   Nausea/Vomiting:   Anxiety:  Depressed   Fatigue:  Loss of appetite:   Pain:                                location:          Review of Systems: [All others negative or Unable to obtain due to poor mentation]    MEDICATIONS  (STANDING):  amLODIPine   Tablet 5 milliGRAM(s) Oral daily  cefTRIAXone   IVPB      cefTRIAXone   IVPB 1000 milliGRAM(s) IV Intermittent every 24 hours  enoxaparin Injectable 80 milliGRAM(s) SubCutaneous every 12 hours  influenza   Vaccine 0.5 milliLiter(s) IntraMuscular once  sodium chloride 0.9%. 1000 milliLiter(s) (100 mL/Hr) IV Continuous <Continuous>    MEDICATIONS  (PRN):  magnesium hydroxide Suspension 30 milliLiter(s) Oral daily PRN Constipation      PHYSICAL EXAM:    Vital Signs Last 24 Hrs  T(C): 37 (14 Oct 2020 05:00), Max: 37 (13 Oct 2020 20:25)  T(F): 98.6 (14 Oct 2020 05:00), Max: 98.6 (13 Oct 2020 20:25)  HR: 86 (14 Oct 2020 05:00) (76 - 86)  BP: 162/93 (14 Oct 2020 05:00) (150/80 - 162/93)  BP(mean): --  RR: 18 (14 Oct 2020 05:00) (17 - 18)  SpO2: 94% (14 Oct 2020 05:00) (94% - 100%)    General: alert  oriented x ____    [lethargic distressed cachexia  nonverbal  unarousable verbal]  Karnofsky Performance Score/Palliative Performance Status Version2:     %    HEENT: normal  dry mouth  ET tube/trach oral lesions:  Lungs: comfortable tachypnea/labored breathing  excessive secretions  CV: normal  tachycardia  GI: normal  distended  tender  incontinent               PEG/NG/OG tube  constipation  last BM:   : normal  incontinent  oliguria/anuria  george  Musculoskeletal: normal  weakness  edema             ambulatory  bedbound/wheelchair bound  Skin: normal  pressure ulcers: stage: edema: other:  Neuro: no deficits cognitive impairment dsyphagia/dysarthria paresis: other:  Oral intake ability: unable/only mouth care [minimal moderate full capability]  Diet: [NPO]    LABS:                        14.3   7.28  )-----------( 217      ( 14 Oct 2020 07:10 )             42.4     10-14    133<L>  |  103  |  8   ----------------------------<  86  4.1   |  22  |  0.71    Ca    10.5      14 Oct 2020 07:10  Mg     1.4     10-14    TPro  6.8  /  Alb  2.2<L>  /  TBili  1.4<H>  /  DBili  x   /  AST  156<H>  /  ALT  76<H>  /  AlkPhos  190<H>  10-14        RADIOLOGY & ADDITIONAL STUDIES:    ADVANCE DIRECTIVES:    HPI:  87 y/o F pt with a significant PMHx of breast cancer(finished radiotherapy in July)  HTN , questionable diabetes and no significant PSHx presents to the ED with c/o generalized fatigue. Patient states extremely tired, decreased appetite, unable to walk, since 1 week associated with significant weight loss since a month. Pt states living with her son, had labs done yesterday which showed that her calcium and liver enzyme levels are elevated.   Patient denies any syncope, fever, chills, chest pain, shortness of breath, abdominal pain, paresthesia, numbness or any other complains.    Interval hx: Pt examined at the bedside, AOX3. Reports fatigue.  Palliative care to establish goals of care.     PAST MEDICAL & SURGICAL HISTORY:  Vitamin D deficiency    Gout    Diabetes mellitus    Cholecystitis    Arthritis    Hyperlipidemia    Hypertension    History of cholecystectomy        SOCIAL HISTORY:    Admitted from: Home with her son     Congregation: Religious                                     Surrogate/HCP/Guardian: Wild Sharma           Phone#: [phone]    FAMILY HISTORY:  Family history of heart disease (Mother)    Family history of diabetes mellitus (DM) (Mother, Sibling)    Family history of hypertension (Father)      Baseline ADLs (prior to admission): independent    Allergies    Benadryl (Swelling)    Intolerances      Present Symptoms:   Dyspnea: denies  Nausea/Vomiting: denies  Fatigue: yes  Loss of appetite: denies  Pain:   yes (unable to quantify)                             location:   left knee       Review of Systems: [All others negative    MEDICATIONS  (STANDING):  amLODIPine   Tablet 5 milliGRAM(s) Oral daily  cefTRIAXone   IVPB      cefTRIAXone   IVPB 1000 milliGRAM(s) IV Intermittent every 24 hours  enoxaparin Injectable 80 milliGRAM(s) SubCutaneous every 12 hours  influenza   Vaccine 0.5 milliLiter(s) IntraMuscular once  sodium chloride 0.9%. 1000 milliLiter(s) (100 mL/Hr) IV Continuous <Continuous>    MEDICATIONS  (PRN):  magnesium hydroxide Suspension 30 milliLiter(s) Oral daily PRN Constipation      PHYSICAL EXAM:    Vital Signs Last 24 Hrs  T(C): 37 (14 Oct 2020 05:00), Max: 37 (13 Oct 2020 20:25)  T(F): 98.6 (14 Oct 2020 05:00), Max: 98.6 (13 Oct 2020 20:25)  HR: 86 (14 Oct 2020 05:00) (76 - 86)  BP: 162/93 (14 Oct 2020 05:00) (150/80 - 162/93)  BP(mean): --  RR: 18 (14 Oct 2020 05:00) (17 - 18)  SpO2: 94% (14 Oct 2020 05:00) (94% - 100%)    General: bitemporal wasting, AOX3, NAD  Karnofsky Performance Score/Palliative Performance Status Version2:  40   %    HEENT: bitemporal wasting, moist mucous membrane   Lungs: CTA unlabored on RA  CV: S1S2, RRR  GI: soft non tender on palpation   : urinating  Musculoskeletal: left knee swelling, weak  Skin: no rash or lesions noted  Neuro: no deficits cognitive impairment  Oral intake ability: poor po intake      LABS:                        14.3   7.28  )-----------( 217      ( 14 Oct 2020 07:10 )             42.4     10    133<L>  |  103  |  8   ----------------------------<  86  4.1   |  22  |  0.71    Ca    10.5      14 Oct 2020 07:10  Mg     1.4     10-14    TPro  6.8  /  Alb  2.2<L>  /  TBili  1.4<H>  /  DBili  x   /  AST  156<H>  /  ALT  76<H>  /  AlkPhos  190<H>  10-14    < from: CT Head No Cont (10.11.20 @ 03:59) >    EXAM:  CT BRAIN                            PROCEDURE DATE:  10/11/2020          INTERPRETATION:  CT HEAD WITHOUT CONTRAST    INDICATION: 88 years old. Female. metastatic disease H/o breast cancer. Weakness    COMPARISON: None available.    TECHNIQUE: Noncontrast axial CT head was obtained from the skull base to vertex.    FINDINGS:  No acute intracranial hemorrhage, mass effect or midline shift.  No CT evidence of acute large territory vascular infarct.  The ventricles and cortical sulci are within normal limits for age.  Patchy hypodensities in the periventricular white matter are nonspecific, but likely sequela of small vessel ischemic disease.    Small mucus retention cyst/polyp in the left maxillary. The mastoid air cells are well aerated.  No displaced calvarial fracture.    IMPRESSION:  No acute intracranial hemorrhage or mass effect. Further evaluation with MRI may be performed as clinically indicated.    < end of copied text >  < from: CT Head No Cont (10.11.20 @ 03:59) >      < from: CT Abdomen and Pelvis w/ IV Cont (10.10.20 @ 19:05) >  EXAM:  CT ABDOMEN AND PELVIS IC                          EXAM:  CT CHEST IC                            PROCEDURE DATE:  10/10/2020          INTERPRETATION:  CLINICAL INFORMATION: Hypercalcemia, fatigue, evaluate for metastases    COMPARISON: None.    PROCEDURE:  CT of the Chest, Abdomen and Pelvis was performed with intravenous contrast.  Intravenous contrast: 90 ml Omnipaque 350. 10 ml discarded.  Oral contrast: None.  Sagittal and coronal reformats were performed.    FINDINGS:    CHEST:    LUNGS AND LARGE AIRWAYS: Patent central airways. Indeterminant 3 mm right upper lobe nodule (series 2 image 46). Small calcified granuloma within the right upper lobe. Mild subpleural scarring/linear atelectasis within the right lower lobe.    PLEURA: No pleural effusion.    VESSELS: No pulmonary embolus. No aortic aneurysm or dissection.    HEART: Heart size is normal. No pericardial effusion. Mild coronary calcifications.    MEDIASTINUM AND CATALINO: Borderline mediastinal lymph nodes includin.5 cm short axis left lower part tracheal lymph node. 1.3 cm short axis subcarinal lymph node.    CHEST WALL AND LOWER NECK: Tiny nodules within the thyroid gland. Right breast skin thickening and irregular asymmetric subareolar density. 2.7 x 1.4 cm necrotic lymph nodes within the right axilla.    ABDOMEN AND PELVIS:    LIVER: Small hepatic cysts. Multiple hypodense lesions measuring up to 2 cm within the liver parenchyma likely metastatic disease. Nodular contour of the liver.  BILE DUCTS: Mild biliary ductal dilatation.  GALLBLADDER: Not visualized.  SPLEEN: Within normal limits.  PANCREAS: Within normal limits.  ADRENALS: Within normal limits.  KIDNEYS/URETERS: Within normal limits.    BLADDER: Wall thickening/irregularity  REPRODUCTIVE ORGANS: Hysterectomy.    BOWEL: No bowel obstruction. Appendix not visualized.  PERITONEUM: Trace fluid within the cul-de-sac.  VESSELS: Thrombosis within left femoral vein extending into the IVC. The abdominal aorta and its branches are patent.  RETROPERITONEUM/LYMPH NODES: No lymphadenopathy.  ABDOMINAL WALL: Within normal limits.  BONES: Hypodense lesions with cortical destruction within the left 2nd and 3rd ribs likely metastatic. Lucent bone lesions within the bilateral iliac and femoral bones are indeterminant.    IMPRESSION:    Thrombosis within left femoral vein extending into the IVC. No pulmonary embolus.    Multiple hypodense lesions measuring up to 2 cm within the liver parenchyma likely metastatic disease.    Hypodense lesions within the left 2nd and 3rd ribs with cortical destruction likely metastatic. Lucent bone lesions within the bilateral iliac and femoral bones are indeterminant. Further evaluation can be performed with PET-CT or bone scan.    Right breast skin thickening and irregular asymmetric subareolar density. 2.7 x 1.4 cm necrotic lymph nodes within the right axilla. Suspicious for breast cancer. Recommend further evaluation with mammogram/ultrasound.    Wall thickening/irregularity of the urinary bladder. Recommendfurther evaluation with aortogram or cystoscopy.    Indeterminant 3 mm right upper lobe nodule. Recommend follow-up chest CT in 3 months.    < end of copied text >        RADIOLOGY & ADDITIONAL STUDIES: Reviewed    ADVANCE DIRECTIVES: FULL CODE   HPI:  87 y/o F pt with a significant PMHx of breast cancer(finished radiotherapy in July)  HTN , questionable diabetes and no significant PSHx presents to the ED with c/o generalized fatigue. Patient states extremely tired, decreased appetite, unable to walk, since 1 week associated with significant weight loss since a month. Pt states living with her son, had labs done yesterday which showed that her calcium and liver enzyme levels are elevated.   Patient denies any syncope, fever, chills, chest pain, shortness of breath, abdominal pain, paresthesia, numbness or any other complains.    Interval hx: Pt examined at the bedside, AOX3. Reports fatigue.  Palliative care to establish goals of care.     PAST MEDICAL & SURGICAL HISTORY:  Vitamin D deficiency    Gout    Diabetes mellitus    Cholecystitis    Arthritis    Hyperlipidemia    Hypertension    History of cholecystectomy        SOCIAL HISTORY:    Admitted from: Home with her son     L[employer] day: Jewish                                     Surrogate/HCP/Guardian: Wild Sharma           Phone#: [phone]    FAMILY HISTORY:  Family history of heart disease (Mother)    Family history of diabetes mellitus (DM) (Mother, Sibling)    Family history of hypertension (Father)      Baseline ADLs (prior to admission): independent    Allergies    Benadryl (Swelling)    Intolerances      Present Symptoms:   Dyspnea: denies  Nausea/Vomiting: denies  Fatigue: yes  Loss of appetite: denies  Pain:   yes (unable to quantify)                             location:   left knee       Review of Systems: [All others negative    MEDICATIONS  (STANDING):  amLODIPine   Tablet 5 milliGRAM(s) Oral daily  cefTRIAXone   IVPB      cefTRIAXone   IVPB 1000 milliGRAM(s) IV Intermittent every 24 hours  enoxaparin Injectable 80 milliGRAM(s) SubCutaneous every 12 hours  influenza   Vaccine 0.5 milliLiter(s) IntraMuscular once  sodium chloride 0.9%. 1000 milliLiter(s) (100 mL/Hr) IV Continuous <Continuous>    MEDICATIONS  (PRN):  magnesium hydroxide Suspension 30 milliLiter(s) Oral daily PRN Constipation      PHYSICAL EXAM:    Vital Signs Last 24 Hrs  T(C): 37 (14 Oct 2020 05:00), Max: 37 (13 Oct 2020 20:25)  T(F): 98.6 (14 Oct 2020 05:00), Max: 98.6 (13 Oct 2020 20:25)  HR: 86 (14 Oct 2020 05:00) (76 - 86)  BP: 162/93 (14 Oct 2020 05:00) (150/80 - 162/93)  BP(mean): --  RR: 18 (14 Oct 2020 05:00) (17 - 18)  SpO2: 94% (14 Oct 2020 05:00) (94% - 100%)    General: bitemporal wasting, AOX3, NAD  Karnofsky Performance Score/Palliative Performance Status Version2:  40   %    HEENT: bitemporal wasting, moist mucous membrane   Lungs: CTA unlabored on RA  CV: S1S2, RRR  GI: soft non tender on palpation   : urinating  Musculoskeletal: left knee swelling, weak  Skin: no rash or lesions noted  Neuro: alert  Oral intake ability: poor po intake      LABS:                        14.3   7.28  )-----------( 217      ( 14 Oct 2020 07:10 )             42.4     10-    133<L>  |  103  |  8   ----------------------------<  86  4.1   |  22  |  0.71    Ca    10.5      14 Oct 2020 07:10  Mg     1.4     10-14    TPro  6.8  /  Alb  2.2<L>  /  TBili  1.4<H>  /  DBili  x   /  AST  156<H>  /  ALT  76<H>  /  AlkPhos  190<H>  10-14    < from: CT Head No Cont (10.11.20 @ 03:59) >    EXAM:  CT BRAIN                            PROCEDURE DATE:  10/11/2020          INTERPRETATION:  CT HEAD WITHOUT CONTRAST    INDICATION: 88 years old. Female. metastatic disease H/o breast cancer. Weakness    COMPARISON: None available.    TECHNIQUE: Noncontrast axial CT head was obtained from the skull base to vertex.    FINDINGS:  No acute intracranial hemorrhage, mass effect or midline shift.  No CT evidence of acute large territory vascular infarct.  The ventricles and cortical sulci are within normal limits for age.  Patchy hypodensities in the periventricular white matter are nonspecific, but likely sequela of small vessel ischemic disease.    Small mucus retention cyst/polyp in the left maxillary. The mastoid air cells are well aerated.  No displaced calvarial fracture.    IMPRESSION:  No acute intracranial hemorrhage or mass effect. Further evaluation with MRI may be performed as clinically indicated.    < end of copied text >  < from: CT Head No Cont (10.11.20 @ 03:59) >      < from: CT Abdomen and Pelvis w/ IV Cont (10.10.20 @ 19:05) >  EXAM:  CT ABDOMEN AND PELVIS IC                          EXAM:  CT CHEST IC                            PROCEDURE DATE:  10/10/2020          INTERPRETATION:  CLINICAL INFORMATION: Hypercalcemia, fatigue, evaluate for metastases    COMPARISON: None.    PROCEDURE:  CT of the Chest, Abdomen and Pelvis was performed with intravenous contrast.  Intravenous contrast: 90 ml Omnipaque 350. 10 ml discarded.  Oral contrast: None.  Sagittal and coronal reformats were performed.    FINDINGS:    CHEST:    LUNGS AND LARGE AIRWAYS: Patent central airways. Indeterminant 3 mm right upper lobe nodule (series 2 image 46). Small calcified granuloma within the right upper lobe. Mild subpleural scarring/linear atelectasis within the right lower lobe.    PLEURA: No pleural effusion.    VESSELS: No pulmonary embolus. No aortic aneurysm or dissection.    HEART: Heart size is normal. No pericardial effusion. Mild coronary calcifications.    MEDIASTINUM AND CATALINO: Borderline mediastinal lymph nodes includin.5 cm short axis left lower part tracheal lymph node. 1.3 cm short axis subcarinal lymph node.    CHEST WALL AND LOWER NECK: Tiny nodules within the thyroid gland. Right breast skin thickening and irregular asymmetric subareolar density. 2.7 x 1.4 cm necrotic lymph nodes within the right axilla.    ABDOMEN AND PELVIS:    LIVER: Small hepatic cysts. Multiple hypodense lesions measuring up to 2 cm within the liver parenchyma likely metastatic disease. Nodular contour of the liver.  BILE DUCTS: Mild biliary ductal dilatation.  GALLBLADDER: Not visualized.  SPLEEN: Within normal limits.  PANCREAS: Within normal limits.  ADRENALS: Within normal limits.  KIDNEYS/URETERS: Within normal limits.    BLADDER: Wall thickening/irregularity  REPRODUCTIVE ORGANS: Hysterectomy.    BOWEL: No bowel obstruction. Appendix not visualized.  PERITONEUM: Trace fluid within the cul-de-sac.  VESSELS: Thrombosis within left femoral vein extending into the IVC. The abdominal aorta and its branches are patent.  RETROPERITONEUM/LYMPH NODES: No lymphadenopathy.  ABDOMINAL WALL: Within normal limits.  BONES: Hypodense lesions with cortical destruction within the left 2nd and 3rd ribs likely metastatic. Lucent bone lesions within the bilateral iliac and femoral bones are indeterminant.    IMPRESSION:    Thrombosis within left femoral vein extending into the IVC. No pulmonary embolus.    Multiple hypodense lesions measuring up to 2 cm within the liver parenchyma likely metastatic disease.    Hypodense lesions within the left 2nd and 3rd ribs with cortical destruction likely metastatic. Lucent bone lesions within the bilateral iliac and femoral bones are indeterminant. Further evaluation can be performed with PET-CT or bone scan.    Right breast skin thickening and irregular asymmetric subareolar density. 2.7 x 1.4 cm necrotic lymph nodes within the right axilla. Suspicious for breast cancer. Recommend further evaluation with mammogram/ultrasound.    Wall thickening/irregularity of the urinary bladder. Recommendfurther evaluation with aortogram or cystoscopy.    Indeterminant 3 mm right upper lobe nodule. Recommend follow-up chest CT in 3 months.    < end of copied text >        RADIOLOGY & ADDITIONAL STUDIES: Reviewed    ADVANCE DIRECTIVES: FULL CODE

## 2020-10-14 NOTE — PROGRESS NOTE ADULT - SUBJECTIVE AND OBJECTIVE BOX
[facility]   MD MARCELLO MCBRIDE MD RUORU WONG, PA    TEL:  OFFICE: [phone]  DR SANCHEZ CELL: [phone]  FRANCISCO JAVIER MILLER CELL: [phone]  DR. TORRES CELL: [phone]  DR. CHÁVEZ CELL: [phone]    FROM 5 PM - 7 AM PLEASE CALL ANSWERING SERVICE: [phone]    RENAL FOLLOW UP NOTE--Date of Service 10-14-20 @ 09:57  --------------------------------------------------------------------------------  HPI:      Pt seen and examined at bedside.   Denies SOB, chest pain     PAST HISTORY  --------------------------------------------------------------------------------  No significant changes to PMH, PSH, FHx, SHx, unless otherwise noted    ALLERGIES & MEDICATIONS  --------------------------------------------------------------------------------  Allergies    Benadryl (Swelling)    Intolerances      Standing Inpatient Medications  amLODIPine   Tablet 5 milliGRAM(s) Oral daily  cefTRIAXone   IVPB      cefTRIAXone   IVPB 1000 milliGRAM(s) IV Intermittent every 24 hours  enoxaparin Injectable 80 milliGRAM(s) SubCutaneous every 12 hours  influenza   Vaccine 0.5 milliLiter(s) IntraMuscular once  magnesium sulfate  IVPB 2 Gram(s) IV Intermittent once  sodium chloride 0.9%. 1000 milliLiter(s) IV Continuous <Continuous>    PRN Inpatient Medications  magnesium hydroxide Suspension 30 milliLiter(s) Oral daily PRN      REVIEW OF SYSTEMS  --------------------------------------------------------------------------------  General: no fever  CVS: no chest pain  RESP: no sob, no cough  ABD: no abdominal pain  : no dysuria,  MSK: no edema     VITALS/PHYSICAL EXAM  --------------------------------------------------------------------------------  T(C): 37 (10-14-20 @ 05:00), Max: 37 (10-13-20 @ 20:25)  HR: 86 (10-14-20 @ 05:00) (76 - 86)  BP: 162/93 (10-14-20 @ 05:00) (150/80 - 162/93)  RR: 18 (10-14-20 @ 05:00) (17 - 18)  SpO2: 94% (10-14-20 @ 05:00) (94% - 100%)  Wt(kg): --        Physical Exam:  	Gen: NAD  	HEENT: MMM  	Pulm: CTA B/L  	CV: S1S2  	Abd: Soft, +BS  	Ext: No LE edema B/L                      Neuro: Awake   	Skin: Warm and Dry   	Vascular access: no hd catheter           no  doris  LABS/STUDIES  --------------------------------------------------------------------------------              14.3   7.28  >-----------<  217      [10-14-20 @ 07:10]              42.4     133  |  103  |  8   ----------------------------<  86      [10-14-20 @ 07:10]  4.1   |  22  |  0.71        Ca     10.5     [10-14-20 @ 07:10]      Mg     1.4     [10-14-20 @ 07:10]    TPro  6.8  /  Alb  2.2  /  TBili  1.4  /  DBili  x   /  AST  156  /  ALT  76  /  AlkPhos  190  [10-14-20 @ 07:10]      PTT: 146.8      [10-12-20 @ 09:59]      Creatinine Trend:  SCr 0.71 [10-14 @ 07:10]  SCr 0.86 [10-13 @ 07:27]  SCr 0.99 [10-12 @ 09:59]  SCr 1.10 [10-11 @ 11:08]  SCr 1.28 [10-10 @ 17:20]    Urinalysis - [10-10-20 @ 22:28]      Color Yellow / Appearance Slightly Turbid / SG 1.015 / pH 5.0      Gluc Negative / Ketone Negative  / Bili Negative / Urobili Negative       Blood Trace / Protein 15 / Leuk Est Small / Nitrite Positive      RBC 2-5 / WBC 6-10 / Hyaline  / Gran  / Sq Epi  / Non Sq Epi Moderate / Bacteria TNTC      Iron 46, TIBC 191, %sat 24      [10-11-20 @ 11:08]  Ferritin 796      [10-11-20 @ 15:34]  Vitamin D (25OH) 40.8      [10-11-20 @ 15:34]  TSH 0.50      [10-11-20 @ 11:08]  Lipid: chol 94, TG 50, HDL 53, LDL 31      [10-11-20 @ 11:08]

## 2020-10-14 NOTE — PROGRESS NOTE ADULT - SUBJECTIVE AND OBJECTIVE BOX
feel ok  no sob or chest pain  but weak    ROS:  Negative except for:    MEDICATIONS  (STANDING):  amLODIPine   Tablet 5 milliGRAM(s) Oral daily  cefTRIAXone   IVPB      cefTRIAXone   IVPB 1000 milliGRAM(s) IV Intermittent every 24 hours  enoxaparin Injectable 80 milliGRAM(s) SubCutaneous every 12 hours  influenza   Vaccine 0.5 milliLiter(s) IntraMuscular once  sodium chloride 0.9%. 1000 milliLiter(s) (100 mL/Hr) IV Continuous <Continuous>    MEDICATIONS  (PRN):  magnesium hydroxide Suspension 30 milliLiter(s) Oral daily PRN Constipation      Allergies    Benadryl (Swelling)    Intolerances        Vital Signs Last 24 Hrs  T(C): 37 (14 Oct 2020 05:00), Max: 37 (13 Oct 2020 20:25)  T(F): 98.6 (14 Oct 2020 05:00), Max: 98.6 (13 Oct 2020 20:25)  HR: 86 (14 Oct 2020 05:00) (76 - 86)  BP: 162/93 (14 Oct 2020 05:00) (150/80 - 162/93)  BP(mean): --  RR: 18 (14 Oct 2020 05:00) (17 - 18)  SpO2: 94% (14 Oct 2020 05:00) (94% - 100%)    PHYSICAL EXAM  General: adult in NAD  HEENT: clear oropharynx, anicteric sclera, pink conjunctiva  Neck: supple  CV: normal S1/S2 with no murmur rubs or gallops  Lungs: positive air movement b/l ant lungs,clear to auscultation, no wheezes, no rales  Abdomen: soft non-tender non-distended, no hepatosplenomegaly  Ext: no clubbing cyanosis or edema  Skin: no rashes and no petechiae  Neuro: alert and oriented X 4, no focal deficits  LABS:                          14.3   7.28  )-----------( 217      ( 14 Oct 2020 07:10 )             42.4         Mean Cell Volume : 84.1 fl  Mean Cell Hemoglobin : 28.4 pg  Mean Cell Hemoglobin Concentration : 33.7 gm/dL  Auto Neutrophil # : x  Auto Lymphocyte # : x  Auto Monocyte # : x  Auto Eosinophil # : x  Auto Basophil # : x  Auto Neutrophil % : x  Auto Lymphocyte % : x  Auto Monocyte % : x  Auto Eosinophil % : x  Auto Basophil % : x    Serial CBC  Hematocrit 42.4  Hemoglobin 14.3  Plat 217  RBC 5.04  WBC 7.28  Serial CBC  Hematocrit 37.4  Hemoglobin 12.8  Plat 224  RBC 4.44  WBC 7.58  Serial CBC  Hematocrit 37.5  Hemoglobin 12.4  Plat 233  RBC 4.40  WBC 7.41  Serial CBC  Hematocrit 33.8  Hemoglobin 11.6  Plat 241  RBC 4.07  WBC 7.00  Serial CBC  Hematocrit 39.3  Hemoglobin 13.4  Plat 288  RBC 4.71  WBC 7.37    10-14    133<L>  |  103  |  8   ----------------------------<  86  4.1   |  22  |  0.71    Ca    10.5      14 Oct 2020 07:10  Mg     1.4     10-14    TPro  6.8  /  Alb  2.2<L>  /  TBili  1.4<H>  /  DBili  x   /  AST  156<H>  /  ALT  76<H>  /  AlkPhos  190<H>  10-14          Ferritin, Serum: 796 ng/mL (10-11 @ 15:34)  Folate, Serum: 5.0 ng/mL (10-11 @ 15:34)  Vitamin B12, Serum: 1830 pg/mL (10-11 @ 15:34)  Iron - Total Binding Capacity.: 191 ug/dL (10-11 @ 11:08)            BLOOD SMEAR INTERPRETATION:       RADIOLOGY & ADDITIONAL STUDIES:

## 2020-10-14 NOTE — DISCHARGE NOTE PROVIDER - PROVIDER TOKENS
FREE:[LAST:[na],FIRST:[na],PHONE:[(   )    -],FAX:[(   )    -],ADDRESS:[Follow up with PCP within 1 week],FOLLOWUP:[1 week]],PROVIDER:[TOKEN:[[id number]],FOLLOWUP:[1 week]]

## 2020-10-14 NOTE — CONSULT NOTE ADULT - PROBLEM SELECTOR RECOMMENDATION 9
probably from mets  will give zometa
right breast ca s/p surgery and RT 15 years ago; completed immunotherapy July 2020.   Now she has recurrence at right axilla, bones and liver with hypercalcemia. ECOG 3.    Oncology following        CT abdomen and Chest with IV contrast shows metastatic lesions in liver, lungs and osteolytic lesions in spine

## 2020-10-14 NOTE — DISCHARGE NOTE PROVIDER - NSDCCPCAREPLAN_GEN_ALL_CORE_FT
PRINCIPAL DISCHARGE DIAGNOSIS  Diagnosis: Metastatic cancer  Assessment and Plan of Treatment: You presented to hospital for Weakness and Fatigue which was likely supected recurrance of your cancer based on CT scan results and high Calcium levels. You were seen by Hematologist/oncologist and out patient work up was recommended.  follow up with PCP and Hematologist/Oncologist within 1 week   continue medications as prescribed      SECONDARY DISCHARGE DIAGNOSES  Diagnosis: Severe protein-calorie malnutrition  Assessment and Plan of Treatment: continue taking small frequent meals   take nutitional supplemets as toletated    Diagnosis: Hypercalcemia  Assessment and Plan of Treatment: your Calcium level was noted to be high on admission, You were given medications  and IV fluids.  your calcium level improved and remained stable   follow up with PCP within 1 week for repeat blood work    Diagnosis: UTI (urinary tract infection)  Assessment and Plan of Treatment: you were also found to have UTI and were started on IV antibiotics and completed the course    Diagnosis: Hypertension  Assessment and Plan of Treatment: Low salt diet  Activity as tolerated.  Take all medication as prescribed.  Follow up with your medical doctor for routine blood pressure monitoring at your next visit.  Notify your doctor if you have any of the following symptoms:   Dizziness, Lightheadedness, Blurry vision, Headache, Chest pain, Shortness of breath       PRINCIPAL DISCHARGE DIAGNOSIS  Diagnosis: Metastatic cancer  Assessment and Plan of Treatment: You presented to hospital for Weakness and Fatigue which was likely supected recurrance of your cancer based on CT scan results and high Calcium levels. You were seen by Hematologist/oncologist and out patient work up was recommended.  Follow up with your outpatient oncologist for a repeat biopsy and treatment.   Follow up with PCP and Hematologist/Oncologist within 1 week   continue medications as prescribed      SECONDARY DISCHARGE DIAGNOSES  Diagnosis: DVT (deep venous thrombosis)  Assessment and Plan of Treatment: You were found to have a large blood clot. Continue taking lovenox two times a day. Follow up with your primary care doctor and heme/onc doctor within one week. Seek immediate medical attention if you develop leg pain, redness, swelling, or shortness of breath or difficulty breathing.    Diagnosis: Severe protein-calorie malnutrition  Assessment and Plan of Treatment: Continue taking small frequent meals. Continue glucerna shakes 1 can two times a day.    Diagnosis: Hypercalcemia  Assessment and Plan of Treatment: Your calcium level was noted to be high on admission. You were treated with medication and IV hydration. Your calcium level improved and remained stable. Follow up with PCP within 1 week for repeat blood work.    Diagnosis: UTI (urinary tract infection)  Assessment and Plan of Treatment: You were also found to have UTI and were started on IV antibiotics. Continue one more day of oral antibotics to complete your course.   HOME CARE INSTRUCTIONS  if you were prescribed antibiotics, take them exactly as your caregiver instructs you. Finish the medication even if you feel better after you have only taken some of the medication.  Drink enough water and fluids to keep your urine clear or pale yellow.  Avoid caffeine, tea, and carbonated beverages. They tend to irritate your bladder.  Empty your bladder often. Avoid holding urine for long periods of time.  Empty your bladder before and after sexual intercourse.  After a bowel movement, women should cleanse from front to back. Use each tissue only once.  SEEK MEDICAL CARE IF:  You have back pain.  You develop a fever.  Your symptoms do not begin to resolve within 3 days.  SEEK IMMEDIATE MEDICAL CARE IF:  You have severe back pain or lower abdominal pain.  You develop chills.  You have nausea or vomiting.  You have continued burning or discomfort with urination.    Diagnosis: Hypertension  Assessment and Plan of Treatment: Low salt diet  Activity as tolerated.  Take all medication as prescribed.  Follow up with your medical doctor for routine blood pressure monitoring at your next visit.  Notify your doctor if you have any of the following symptoms:   Dizziness, Lightheadedness, Blurry vision, Headache, Chest pain, Shortness of breath

## 2020-10-14 NOTE — DISCHARGE NOTE PROVIDER - HOSPITAL COURSE
87 y/o F pt with a significant PMHx of breast cancer(finished radiotherapy in July)  HTN , questionable diabetes and no significant PSHx presents to the ED with c/o generalized fatigue. Patient states extremely tired, decreased appetite, unable to walk, since 1 week associated with significant weight loss since a month. Pt states living with her son, had labs done yesterday which showed that her calcium and liver enzyme levels are elevated.   Patient denies any syncope, fever, chills, chest pain, shortness of breath, abdominal pain, paresthesia, numbness or any other complains. 87 y/o F pt with a significant PMHx of breast cancer(finished radiotherapy in July)  HTN , questionable diabetes and no significant PSHx presents to the ED with c/o generalized fatigue.   Pt. with CT A/P/C suspicious for metastatic Ca associated with hypercalcemia (Ca 12.3) and increased generalized weakness.  Pt. also with Thrombosis within left femoral vein extending into the IVC. No pulmonary embolus. Pt. admitted to medicine, followed by nephro and hem/onc, was started on Heparin gtt. which was transitioned to full dose Lovenox.  Pt. was also noted with UTI, treated with IV Rocephin.   Hospital course complicated with Persisting hypercalcemia, received Zometa 4mg x 1 dose and IVF, followd by heme Dr. Gold following, Pt. will likely need rebiopsy per Dr. Gold. outpt.   Vascular consulted for evaluation of fem thrombus, no acute interventions recommended.   Pt evaluated by PT and ARCELIA recommended, pt's son adamantly refused ARCELIA and wants to take pt home.   Pt is planned to be discharged home on SQ Lovenox, pts son agreeable to learn and inject pt everyday   medically optimized for discharge with outpt follow up  Please note that this a brief summary of hospital course please refer to daily progress notes and consult notes for full course and events   87 y/o F pt with a significant PMHx of breast cancer(finished radiotherapy in July)  HTN , questionable diabetes and no significant PSHx presents to the ED with c/o generalized fatigue.   CT abdomen and pelvis --> with thrombosis in left femoral vein extending in the IVC, no PE, Multiple hypodense lesions measuring up to 2 cm within the liver parenchyma likely metastatic disease, hypodense lesions within the left 2nd and 3rd ribs with cortical destruction likely metastatic. Lucent bone lesions within the bilateral iliac and femoral bones are indeterminant. Right breast skin thickening and irregular asymmetric subareolar density. 2.7 x 1.4 cm necrotic lymph nodes within the right axilla. Suspicious for breast cancer. Recommend further evaluation with mammogram/ultrasound. Wall thickening/irregularity of the urinary bladder. Recommend further evaluation with aortogram or cystoscopy. Indeterminant 3 mm right upper lobe nodule. Recommend follow-up chest CT in 3 months. Also found to have hypercalcemia.     Admitted to medicine for workup for metastatic cancer, thrombosis, and hypercalcemia. Hypercalcemia treates with zometa and IV hydration. Followed by heme/onc and nephrology. Started on heparin drip for DVT and transitioned to full dose lovenox. Vascular consulted for evaluation of fem thrombus, no acute interventions recommended. Noted to have UTI, urine cultures with E. Coli, treated with rocephin. To complete course with PO ceftin. Pt with recurrent breast cancer at right axilla with metastasis to bones and liver. Pt will need re biopsy to see if any change in characteristics. Plan to follow up with her oncologist to arrange for biopsy as outpatient.      Pt evaluated by PT and ARCELIA recommended, pt's son adamantly refused ARCELIA and wants to take pt home.   Pt is planned to be discharged home on SQ Lovenox, pts son agreeable to learn and inject pt everyday   medically optimized for discharge with outpt follow up  Please note that this a brief summary of hospital course please refer to daily progress notes and consult notes for full course and events

## 2020-10-14 NOTE — PROGRESS NOTE ADULT - PROBLEM SELECTOR PLAN 5
UA+, denies dysuria, frequency, urgency suprapubic pain  -Urine Cx grew E. Coli  -Cont Rocephin for now  -Blood Cx NTD

## 2020-10-14 NOTE — PROGRESS NOTE ADULT - ASSESSMENT
HYPERCALCEMIA: This is associate with bony lesions  and hepatic lesions, likely mets PO4 is low so possible  PTH related peptide induced.  However, may also be due to direct bone mets. Mild DALY associated.  -Improving calcium   - Follow up BMP.  - No Furosemide.  - PTH and PTHrP levels.  - s/ Zometa 10/13    Hypophosphatemia  Monitor serum PO4  replete as needed    Hyponatremia  optimize glucose control  Change all antibiotics to be given in NS instead of D5    Hypomagnesemia  repleted today  Monitor serum mag

## 2020-10-15 ENCOUNTER — TRANSCRIPTION ENCOUNTER (OUTPATIENT)
Age: 85
End: 2020-10-15

## 2020-10-15 VITALS
SYSTOLIC BLOOD PRESSURE: 122 MMHG | RESPIRATION RATE: 18 BRPM | OXYGEN SATURATION: 100 % | HEART RATE: 86 BPM | TEMPERATURE: 98 F | DIASTOLIC BLOOD PRESSURE: 81 MMHG

## 2020-10-15 DIAGNOSIS — Z71.89 OTHER SPECIFIED COUNSELING: ICD-10-CM

## 2020-10-15 DIAGNOSIS — C50.919 MALIGNANT NEOPLASM OF UNSPECIFIED SITE OF UNSPECIFIED FEMALE BREAST: ICD-10-CM

## 2020-10-15 LAB
ALBUMIN SERPL ELPH-MCNC: 2.2 G/DL — LOW (ref 3.5–5)
ALP SERPL-CCNC: 192 U/L — HIGH (ref 40–120)
ALT FLD-CCNC: 79 U/L DA — HIGH (ref 10–60)
ANION GAP SERPL CALC-SCNC: 8 MMOL/L — SIGNIFICANT CHANGE UP (ref 5–17)
AST SERPL-CCNC: 164 U/L — HIGH (ref 10–40)
BILIRUB SERPL-MCNC: 1.1 MG/DL — SIGNIFICANT CHANGE UP (ref 0.2–1.2)
BUN SERPL-MCNC: 12 MG/DL — SIGNIFICANT CHANGE UP (ref 7–18)
CALCIUM SERPL-MCNC: 10.2 MG/DL — SIGNIFICANT CHANGE UP (ref 8.4–10.5)
CHLORIDE SERPL-SCNC: 102 MMOL/L — SIGNIFICANT CHANGE UP (ref 96–108)
CO2 SERPL-SCNC: 22 MMOL/L — SIGNIFICANT CHANGE UP (ref 22–31)
CREAT SERPL-MCNC: 0.85 MG/DL — SIGNIFICANT CHANGE UP (ref 0.5–1.3)
CULTURE RESULTS: SIGNIFICANT CHANGE UP
GLUCOSE SERPL-MCNC: 91 MG/DL — SIGNIFICANT CHANGE UP (ref 70–99)
HCT VFR BLD CALC: 37.4 % — SIGNIFICANT CHANGE UP (ref 34.5–45)
HGB BLD-MCNC: 12.7 G/DL — SIGNIFICANT CHANGE UP (ref 11.5–15.5)
MAGNESIUM SERPL-MCNC: 1.9 MG/DL — SIGNIFICANT CHANGE UP (ref 1.6–2.6)
MCHC RBC-ENTMCNC: 28 PG — SIGNIFICANT CHANGE UP (ref 27–34)
MCHC RBC-ENTMCNC: 34 GM/DL — SIGNIFICANT CHANGE UP (ref 32–36)
MCV RBC AUTO: 82.4 FL — SIGNIFICANT CHANGE UP (ref 80–100)
NRBC # BLD: 0 /100 WBCS — SIGNIFICANT CHANGE UP (ref 0–0)
PHOSPHATE SERPL-MCNC: 1.6 MG/DL — LOW (ref 2.5–4.5)
PLATELET # BLD AUTO: 253 K/UL — SIGNIFICANT CHANGE UP (ref 150–400)
POTASSIUM SERPL-MCNC: 4.5 MMOL/L — SIGNIFICANT CHANGE UP (ref 3.5–5.3)
POTASSIUM SERPL-SCNC: 4.5 MMOL/L — SIGNIFICANT CHANGE UP (ref 3.5–5.3)
PROT SERPL-MCNC: 6.8 G/DL — SIGNIFICANT CHANGE UP (ref 6–8.3)
RBC # BLD: 4.54 M/UL — SIGNIFICANT CHANGE UP (ref 3.8–5.2)
RBC # FLD: 16.3 % — HIGH (ref 10.3–14.5)
SODIUM SERPL-SCNC: 132 MMOL/L — LOW (ref 135–145)
SPECIMEN SOURCE: SIGNIFICANT CHANGE UP
WBC # BLD: 8.91 K/UL — SIGNIFICANT CHANGE UP (ref 3.8–10.5)
WBC # FLD AUTO: 8.91 K/UL — SIGNIFICANT CHANGE UP (ref 3.8–10.5)

## 2020-10-15 PROCEDURE — 86769 SARS-COV-2 COVID-19 ANTIBODY: CPT

## 2020-10-15 PROCEDURE — 82652 VIT D 1 25-DIHYDROXY: CPT

## 2020-10-15 PROCEDURE — 84443 ASSAY THYROID STIM HORMONE: CPT

## 2020-10-15 PROCEDURE — 82306 VITAMIN D 25 HYDROXY: CPT

## 2020-10-15 PROCEDURE — 82746 ASSAY OF FOLIC ACID SERUM: CPT

## 2020-10-15 PROCEDURE — 85610 PROTHROMBIN TIME: CPT

## 2020-10-15 PROCEDURE — 82607 VITAMIN B-12: CPT

## 2020-10-15 PROCEDURE — 82962 GLUCOSE BLOOD TEST: CPT

## 2020-10-15 PROCEDURE — 81001 URINALYSIS AUTO W/SCOPE: CPT

## 2020-10-15 PROCEDURE — 80061 LIPID PANEL: CPT

## 2020-10-15 PROCEDURE — 97162 PT EVAL MOD COMPLEX 30 MIN: CPT

## 2020-10-15 PROCEDURE — 85025 COMPLETE CBC W/AUTO DIFF WBC: CPT

## 2020-10-15 PROCEDURE — 85027 COMPLETE CBC AUTOMATED: CPT

## 2020-10-15 PROCEDURE — 86300 IMMUNOASSAY TUMOR CA 15-3: CPT

## 2020-10-15 PROCEDURE — 71260 CT THORAX DX C+: CPT

## 2020-10-15 PROCEDURE — 84100 ASSAY OF PHOSPHORUS: CPT

## 2020-10-15 PROCEDURE — 87186 SC STD MICRODIL/AGAR DIL: CPT

## 2020-10-15 PROCEDURE — 82378 CARCINOEMBRYONIC ANTIGEN: CPT

## 2020-10-15 PROCEDURE — 36415 COLL VENOUS BLD VENIPUNCTURE: CPT

## 2020-10-15 PROCEDURE — 82728 ASSAY OF FERRITIN: CPT

## 2020-10-15 PROCEDURE — 87086 URINE CULTURE/COLONY COUNT: CPT

## 2020-10-15 PROCEDURE — 83519 RIA NONANTIBODY: CPT

## 2020-10-15 PROCEDURE — 83735 ASSAY OF MAGNESIUM: CPT

## 2020-10-15 PROCEDURE — 93306 TTE W/DOPPLER COMPLETE: CPT

## 2020-10-15 PROCEDURE — 80053 COMPREHEN METABOLIC PANEL: CPT

## 2020-10-15 PROCEDURE — 83540 ASSAY OF IRON: CPT

## 2020-10-15 PROCEDURE — 71046 X-RAY EXAM CHEST 2 VIEWS: CPT

## 2020-10-15 PROCEDURE — 80048 BASIC METABOLIC PNL TOTAL CA: CPT

## 2020-10-15 PROCEDURE — 85730 THROMBOPLASTIN TIME PARTIAL: CPT

## 2020-10-15 PROCEDURE — 83550 IRON BINDING TEST: CPT

## 2020-10-15 PROCEDURE — 93005 ELECTROCARDIOGRAM TRACING: CPT

## 2020-10-15 PROCEDURE — 99285 EMERGENCY DEPT VISIT HI MDM: CPT

## 2020-10-15 PROCEDURE — 87635 SARS-COV-2 COVID-19 AMP PRB: CPT

## 2020-10-15 PROCEDURE — 74177 CT ABD & PELVIS W/CONTRAST: CPT

## 2020-10-15 PROCEDURE — 87040 BLOOD CULTURE FOR BACTERIA: CPT

## 2020-10-15 PROCEDURE — 70450 CT HEAD/BRAIN W/O DYE: CPT

## 2020-10-15 PROCEDURE — 83036 HEMOGLOBIN GLYCOSYLATED A1C: CPT

## 2020-10-15 PROCEDURE — 84484 ASSAY OF TROPONIN QUANT: CPT

## 2020-10-15 RX ORDER — POTASSIUM PHOSPHATE, MONOBASIC POTASSIUM PHOSPHATE, DIBASIC 236; 224 MG/ML; MG/ML
30 INJECTION, SOLUTION INTRAVENOUS ONCE
Refills: 0 | Status: COMPLETED | OUTPATIENT
Start: 2020-10-15 | End: 2020-10-15

## 2020-10-15 RX ORDER — CEFUROXIME AXETIL 250 MG
1 TABLET ORAL
Qty: 2 | Refills: 0
Start: 2020-10-15 | End: 2020-10-15

## 2020-10-15 RX ORDER — MAGNESIUM HYDROXIDE 400 MG/1
30 TABLET, CHEWABLE ORAL
Qty: 0 | Refills: 0 | DISCHARGE
Start: 2020-10-15

## 2020-10-15 RX ADMIN — AMLODIPINE BESYLATE 5 MILLIGRAM(S): 2.5 TABLET ORAL at 05:07

## 2020-10-15 RX ADMIN — ENOXAPARIN SODIUM 80 MILLIGRAM(S): 100 INJECTION SUBCUTANEOUS at 17:41

## 2020-10-15 RX ADMIN — ENOXAPARIN SODIUM 80 MILLIGRAM(S): 100 INJECTION SUBCUTANEOUS at 05:07

## 2020-10-15 RX ADMIN — POTASSIUM PHOSPHATE, MONOBASIC POTASSIUM PHOSPHATE, DIBASIC 83.33 MILLIMOLE(S): 236; 224 INJECTION, SOLUTION INTRAVENOUS at 12:45

## 2020-10-15 NOTE — PROGRESS NOTE ADULT - PROBLEM SELECTOR PLAN 2
Left femoral vein thrombosis with extension into IVC  Continue full dose lovenox  Vascular consulted, no interventions at this time

## 2020-10-15 NOTE — PROGRESS NOTE ADULT - PROBLEM SELECTOR PLAN 3
Afebrile, no leukocytosis   Urine culture with E Coli   Continue rocephin day 6  To complete 7 day course with one more day of ceftin 250mg BID

## 2020-10-15 NOTE — PROGRESS NOTE ADULT - PROBLEM SELECTOR PLAN 6
PT recommending ARCELIA  Family wants to take patient home   Son Wild taught lovenox injections by nursing staff

## 2020-10-15 NOTE — DISCHARGE NOTE NURSING/CASE MANAGEMENT/SOCIAL WORK - PATIENT PORTAL LINK FT
You can access the FollowMyHealth Patient Portal offered by [facility] by registering at the following website: [email or web address]. By joining [facility]’s FollowMyHealth portal, you will also be able to view your health information using other applications (apps) compatible with our system.

## 2020-10-15 NOTE — PROGRESS NOTE ADULT - PROBLEM SELECTOR PLAN 1
Hx of breast cancer 15 years ago, now with recurrence at right axilla, bones, liver  Will need rebiopsy as outpatient   Pt to follow up with private oncologist as outpatient

## 2020-10-15 NOTE — PROGRESS NOTE ADULT - SUBJECTIVE AND OBJECTIVE BOX
condition stable  no sob or pain  on lovenox    ROS:  Negative except for:    MEDICATIONS  (STANDING):  amLODIPine   Tablet 5 milliGRAM(s) Oral daily  cefTRIAXone   IVPB      cefTRIAXone   IVPB 1000 milliGRAM(s) IV Intermittent every 24 hours  enoxaparin Injectable 80 milliGRAM(s) SubCutaneous every 12 hours  influenza   Vaccine 0.5 milliLiter(s) IntraMuscular once  sodium chloride 0.9%. 1000 milliLiter(s) (100 mL/Hr) IV Continuous <Continuous>    MEDICATIONS  (PRN):  magnesium hydroxide Suspension 30 milliLiter(s) Oral daily PRN Constipation      Allergies    Benadryl (Swelling)    Intolerances        Vital Signs Last 24 Hrs  T(C): 36.7 (15 Oct 2020 04:46), Max: 36.8 (14 Oct 2020 13:29)  T(F): 98 (15 Oct 2020 04:46), Max: 98.3 (14 Oct 2020 13:29)  HR: 85 (15 Oct 2020 04:46) (82 - 90)  BP: 138/85 (15 Oct 2020 04:46) (119/83 - 138/85)  BP(mean): --  RR: 18 (15 Oct 2020 04:46) (16 - 18)  SpO2: 100% (15 Oct 2020 04:46) (97% - 100%)    PHYSICAL EXAM  General: adult in NAD  HEENT: clear oropharynx, anicteric sclera, pink conjunctiva  Neck: supple  CV: normal S1/S2 with no murmur rubs or gallops  Lungs: positive air movement b/l ant lungs,clear to auscultation, no wheezes, no rales  Abdomen: soft non-tender non-distended, no hepatosplenomegaly  Ext: no clubbing cyanosis or edema  Skin: no rashes and no petechiae  Neuro: alert and oriented X 4, no focal deficits  LABS:                          12.7   8.91  )-----------( 253      ( 15 Oct 2020 06:39 )             37.4         Mean Cell Volume : 82.4 fl  Mean Cell Hemoglobin : 28.0 pg  Mean Cell Hemoglobin Concentration : 34.0 gm/dL  Auto Neutrophil # : x  Auto Lymphocyte # : x  Auto Monocyte # : x  Auto Eosinophil # : x  Auto Basophil # : x  Auto Neutrophil % : x  Auto Lymphocyte % : x  Auto Monocyte % : x  Auto Eosinophil % : x  Auto Basophil % : x    Serial CBC  Hematocrit 37.4  Hemoglobin 12.7  Plat 253  RBC 4.54  WBC 8.91  Serial CBC  Hematocrit 42.4  Hemoglobin 14.3  Plat 217  RBC 5.04  WBC 7.28  Serial CBC  Hematocrit 37.4  Hemoglobin 12.8  Plat 224  RBC 4.44  WBC 7.58  Serial CBC  Hematocrit 37.5  Hemoglobin 12.4  Plat 233  RBC 4.40  WBC 7.41  Serial CBC  Hematocrit 33.8  Hemoglobin 11.6  Plat 241  RBC 4.07  WBC 7.00    10-15    132<L>  |  102  |  12  ----------------------------<  91  4.5   |  22  |  0.85    Ca    10.2      15 Oct 2020 06:39  Mg     1.9     10-15    TPro  6.8  /  Alb  2.2<L>  /  TBili  1.1  /  DBili  x   /  AST  164<H>  /  ALT  79<H>  /  AlkPhos  192<H>  10-15          Ferritin, Serum: 796 ng/mL (10-11 @ 15:34)  Folate, Serum: 5.0 ng/mL (10-11 @ 15:34)  Vitamin B12, Serum: 1830 pg/mL (10-11 @ 15:34)  Iron - Total Binding Capacity.: 191 ug/dL (10-11 @ 11:08)            BLOOD SMEAR INTERPRETATION:       RADIOLOGY & ADDITIONAL STUDIES:

## 2020-10-15 NOTE — PROGRESS NOTE ADULT - ASSESSMENT
HYPERCALCEMIA: This is associate with bony lesions  and hepatic lesions, likely mets PO4 is low so possible  PTH related peptide induced.  However, may also be due to direct bone mets. Mild DALY associated.  -Improving calcium   - Follow up BMP.  - No Furosemide.  - PTH and PTHrP levels.  - s/ Zometa 10/13    Hypophosphatemia  Monitor serum PO4  repleted today    Hyponatremia  optimize glucose control  Change all antibiotics to be given in NS instead of D5    Hypomagnesemia  improved  Monitor serum mag

## 2020-10-15 NOTE — PROGRESS NOTE ADULT - PROBLEM SELECTOR PLAN 8
Pt is unaware of recurrent cancer. Palliative consulted, son Wild does not want palliative care involved at this time, did not want to discuss GOC   Full code
RISK                                                          Points  [] Previous VTE                                           3  [] Thrombophilia                                        2  [] Lower limb paralysis                              2   [x] Current Cancer                                       2   [x] Immobilization > 24 hrs                        1  [] ICU/CCU stay > 24 hours                       1  [x] Age > 60                                                   1    Improve score 4  Heparin drip for DVT

## 2020-10-15 NOTE — PROGRESS NOTE ADULT - SUBJECTIVE AND OBJECTIVE BOX
NP Note discussed with  Primary Attending    Patient is a 88y old  Female who presents with a chief complaint of Generalized weakness (15 Oct 2020 11:12)    89 y/o F pt with a significant PMHx of breast cancer(finished radiotherapy in July)  HTN , questionable diabetes and no significant PSHx presents to the ED with c/o generalized fatigue.   CT abdomen and pelvis --> with thrombosis in left femoral vein extending in the IVC, no PE, Multiple hypodense lesions measuring up to 2 cm within the liver parenchyma likely metastatic disease, hypodense lesions within the left 2nd and 3rd ribs with cortical destruction likely metastatic. Lucent bone lesions within the bilateral iliac and femoral bones are indeterminant. Right breast skin thickening and irregular asymmetric subareolar density. 2.7 x 1.4 cm necrotic lymph nodes within the right axilla. Suspicious for breast cancer. Recommend further evaluation with mammogram/ultrasound. Wall thickening/irregularity of the urinary bladder. Recommend further evaluation with aortogram or cystoscopy. Indeterminant 3 mm right upper lobe nodule. Recommend follow-up chest CT in 3 months. Also found to have hypercalcemia.     Admitted to medicine for workup for metastatic cancer, thrombosis, and hypercalcemia. Hypercalcemia treates with zometa and IV hydration. Followed by heme/onc and nephrology. Started on heparin drip for DVT and transitioned to full dose lovenox. Vascular consulted for evaluation of fem thrombus, no acute interventions recommended. Noted to have UTI, urine cultures with E. Coli, treated with rocephin. To complete course with PO ceftin. Pt with recurrent breast cancer at right axilla with metastasis to bones and liver. Pt will need re biopsy to see if any change in characteristics. Plan to follow up with her oncologist to arrange for biopsy as outpatient.      Pt evaluated by PT and ARCELIA recommended, pt's son adamantly refused ARCELIA and wants to take pt home.   Pt is planned to be discharged home on SQ Lovenox, pts son agreeable to learn and inject pt everyday     INTERVAL HPI/OVERNIGHT EVENTS: no new complaints    MEDICATIONS  (STANDING):  amLODIPine   Tablet 5 milliGRAM(s) Oral daily  cefTRIAXone   IVPB      cefTRIAXone   IVPB 1000 milliGRAM(s) IV Intermittent every 24 hours  enoxaparin Injectable 80 milliGRAM(s) SubCutaneous every 12 hours  influenza   Vaccine 0.5 milliLiter(s) IntraMuscular once  sodium chloride 0.9%. 1000 milliLiter(s) (100 mL/Hr) IV Continuous <Continuous>    MEDICATIONS  (PRN):  magnesium hydroxide Suspension 30 milliLiter(s) Oral daily PRN Constipation      __________________________________________________  REVIEW OF SYSTEMS:    CONSTITUTIONAL: No fever,   EYES: no acute visual disturbances  NECK: No pain or stiffness  RESPIRATORY: No cough; No shortness of breath  CARDIOVASCULAR: No chest pain, no palpitations  GASTROINTESTINAL: No pain. No nausea or vomiting; No diarrhea   NEUROLOGICAL: No headache or numbness, no tremors  MUSCULOSKELETAL: No joint pain, no muscle pain  GENITOURINARY: no dysuria, no frequency, no hesitancy  PSYCHIATRY: no depression , no anxiety  ALL OTHER  ROS negative        Vital Signs Last 24 Hrs  T(C): 37.1 (15 Oct 2020 14:37), Max: 37.1 (15 Oct 2020 14:37)  T(F): 98.8 (15 Oct 2020 14:37), Max: 98.8 (15 Oct 2020 14:37)  HR: 66 (15 Oct 2020 14:37) (66 - 85)  BP: 111/77 (15 Oct 2020 14:37) (111/77 - 138/85)  BP(mean): --  RR: 16 (15 Oct 2020 14:37) (16 - 18)  SpO2: 99% (15 Oct 2020 14:37) (99% - 100%)    ________________________________________________  PHYSICAL EXAM:  GENERAL: NAD  HEENT: Normocephalic;  conjunctivae and sclerae clear; moist mucous membranes;   NECK : supple  CHEST/LUNG: Clear to auscultation bilaterally with good air entry   HEART: S1 S2  regular; no murmurs, gallops or rubs  ABDOMEN: Soft, Nontender, Nondistended; Bowel sounds present  EXTREMITIES: no cyanosis; no edema; no calf tenderness  SKIN: warm and dry; no rash  NERVOUS SYSTEM:  Awake and alert; Oriented  to place, person and time ; no new deficits    _________________________________________________  LABS:                        12.7   8.91  )-----------( 253      ( 15 Oct 2020 06:39 )             37.4     10-15    132<L>  |  102  |  12  ----------------------------<  91  4.5   |  22  |  0.85    Ca    10.2      15 Oct 2020 06:39  Phos  1.6     10-15  Mg     1.9     10-15    TPro  6.8  /  Alb  2.2<L>  /  TBili  1.1  /  DBili  x   /  AST  164<H>  /  ALT  79<H>  /  AlkPhos  192<H>  10-15        CAPILLARY BLOOD GLUCOSE    RADIOLOGY & ADDITIONAL TESTS:    < from: CT Abdomen and Pelvis w/ IV Cont (10.10.20 @ 19:05) >  IMPRESSION:    Thrombosis within left femoral vein extending into the IVC. No pulmonary embolus.    Multiple hypodense lesions measuring up to 2 cm within the liver parenchyma likely metastatic disease.    Hypodense lesions within the left 2nd and 3rd ribs with cortical destruction likely metastatic. Lucent bone lesions within the bilateral iliac and femoral bones are indeterminant. Further evaluation can be performed with PET-CT or bone scan.    Right breast skin thickening and irregular asymmetric subareolar density. 2.7 x 1.4 cm necrotic lymph nodes within the right axilla. Suspicious for breast cancer. Recommend further evaluation with mammogram/ultrasound.    Wall thickening/irregularity of the urinary bladder. Recommendfurther evaluation with aortogram or cystoscopy.    Indeterminant 3 mm right upper lobe nodule. Recommend follow-up chest CT in 3 months.      < end of copied text >      Imaging  Reviewed:  YES  Consultant(s) Notes Reviewed:   YES      Plan of care was discussed with patient and /or primary care giver; all questions and concerns were addressed

## 2020-10-15 NOTE — PROGRESS NOTE ADULT - PROBLEM SELECTOR PLAN 4
S/P zometa   Secondary to bone mets  Calcium 12.3->10.2  -Nephro Dr. cohen following  -Cont IVF hydration NS@100ml/hr  -Zometa 4mg IV x 1 dose per hem/onc on 10/13  -f/u PTH and PTHrP levels  -f/u BMP.

## 2020-10-15 NOTE — PROGRESS NOTE ADULT - ASSESSMENT
· Assessment	  88 year old lady who had right breast ca s/p surgery and RT 15 years ago.  She just finished hormonal therapy in July.  Now she has recurrence at right axilla, bones and liver with hypercalcemia    Problem/Recommendation - 1:  Problem: Hypercalcemia. Recommendation: probably from mets  will give zometa.  ca normal now    Problem/Recommendation - 2:  ·  Problem: DVT (deep venous thrombosis).  Recommendation: up to IVC  ?vascular to evaluate for TPA, vascular does not think she needs  on heparin.   no evidence of PE  can change to lovenox or eliquis    Problem/Recommendation - 3:  ·  Problem: Metastatic breast carcinoma.  Recommendation: will need a rebiopsy to see any change of characteristics  will discuss with family if they want to go through this.   she can follow up with her outside oncologist to arrange biopsy as outpt  discussed with her. she understands the arrangement

## 2020-10-15 NOTE — PROGRESS NOTE ADULT - REASON FOR ADMISSION
Generalized weakness

## 2020-10-15 NOTE — PROGRESS NOTE ADULT - PROBLEM SELECTOR PROBLEM 8
Advanced care planning/counseling discussion
Prophylactic measure

## 2020-10-15 NOTE — PROGRESS NOTE ADULT - SUBJECTIVE AND OBJECTIVE BOX
INTERVAL HPI/OVERNIGHT EVENTS: I feel fine so want to go home.   Vital Signs Last 24 Hrs  T(C): 36.7 (15 Oct 2020 04:46), Max: 36.8 (14 Oct 2020 13:29)  T(F): 98 (15 Oct 2020 04:46), Max: 98.3 (14 Oct 2020 13:29)  HR: 85 (15 Oct 2020 04:46) (82 - 90)  BP: 138/85 (15 Oct 2020 04:46) (119/83 - 138/85)  BP(mean): --  RR: 18 (15 Oct 2020 04:46) (16 - 18)  SpO2: 100% (15 Oct 2020 04:46) (97% - 100%)  I&O's Summary    MEDICATIONS  (STANDING):  amLODIPine   Tablet 5 milliGRAM(s) Oral daily  cefTRIAXone   IVPB      cefTRIAXone   IVPB 1000 milliGRAM(s) IV Intermittent every 24 hours  enoxaparin Injectable 80 milliGRAM(s) SubCutaneous every 12 hours  influenza   Vaccine 0.5 milliLiter(s) IntraMuscular once  sodium chloride 0.9%. 1000 milliLiter(s) (100 mL/Hr) IV Continuous <Continuous>    MEDICATIONS  (PRN):  magnesium hydroxide Suspension 30 milliLiter(s) Oral daily PRN Constipation    LABS:                        12.7   8.91  )-----------( 253      ( 15 Oct 2020 06:39 )             37.4     10-15    132<L>  |  102  |  12  ----------------------------<  91  4.5   |  22  |  0.85    Ca    10.2      15 Oct 2020 06:39  Phos  1.6     10-15  Mg     1.9     10-15    TPro  6.8  /  Alb  2.2<L>  /  TBili  1.1  /  DBili  x   /  AST  164<H>  /  ALT  79<H>  /  AlkPhos  192<H>  10-15        CAPILLARY BLOOD GLUCOSE              REVIEW OF SYSTEMS:  CONSTITUTIONAL: No fever, weight loss, or fatigue  EYES: No eye pain, visual disturbances, or discharge  ENMT:  No difficulty hearing, tinnitus, vertigo; No sinus or throat pain  NECK: No pain or stiffness  RESPIRATORY: No cough, wheezing, chills or hemoptysis; No shortness of breath  CARDIOVASCULAR: No chest pain, palpitations, dizziness, or leg swelling  GASTROINTESTINAL: No abdominal or epigastric pain. No nausea, vomiting, or hematemesis; No diarrhea or constipation. No melena or hematochezia.  GENITOURINARY: No dysuria, frequency, hematuria, or incontinence  NEUROLOGICAL: No headaches, memory loss, loss of strength, numbness, or tremors      Consultant(s) Notes Reviewed:  [x ] YES  [ ] NO    PHYSICAL EXAM:  GENERAL: NAD, well-groomed, well-developed, not in any distress ,  HEAD:  Atraumatic, Normocephalic  EYES: EOMI, PERRLA, conjunctiva and sclera clear  ENMT: No tonsillar erythema, exudates, or enlargement; Moist mucous membranes, Good dentition, No lesions  NECK: Supple, No JVD, Normal thyroid  NERVOUS SYSTEM:  Alert & Oriented X3, No focal deficit   CHEST/LUNG: Good air entry bilateral with no  rales, rhonchi, wheezing, or rubs  HEART: Regular rate and rhythm; No murmurs, rubs, or gallops  ABDOMEN: Soft, Nontender, Nondistended; Bowel sounds present  EXTREMITIES:  2+ Peripheral Pulses, No clubbing, cyanosis, or edema    Care Discussed with Consultants/Other Providers [ x] YES  [ ] NO

## 2020-10-15 NOTE — PROGRESS NOTE ADULT - ASSESSMENT
89 y/o F pt with a significant PMHx of breast cancer(finished radiotherapy in July)  HTN and no significant PSHx presents to the ED with c/o generalized fatigue  Admitting for failure to thrive and metastatic cancer evaluation     Problem/Plan - 1:  ·  Problem: Fatigue, unspecified type.  Plan: Multifactorial . Improving. PT helping.      Problem/Plan - 2:  ·  Problem: UTI (urinary tract infection).  Plan: - -SEc to E coli .   IV Rocephin and will change to PO ceftin to complete 1 week Rx.      Problem/Plan - 3:  ·  Problem: Metastatic breast carcinoma.  Plan: Oncology helping and planning Bx outpt as risky to stop AC for now.      Problem/Plan - 4:  ·  Problem: Hypercalcemia.  Plan: Patient has Calcium 12.3. Improving.    corrected calcium 13.9  -Most likely due to malignancy     Problem/Plan - 5:  ·  Problem: DVT (deep venous thrombosis).  Plan: Thrombosis within left femoral vein extending into the IVC. No pulmonary embolus.  -On Lovenox. can be dcd on lovenox or NOAC. Son agreed to give her Lovenox shots.   - Monitor PTT.      Problem/Plan - 6:  Problem: Hypertension. Plan: Patient takes Amlodipine 5mg   Will continue with parameters   Dash diet  f.u A1C, Lipid profile.     Problem/Plan - 7:  ·  Problem: Abnormal LFT .  Plan: Sec to metastatic disease.     Disposition : DC planning home  to have outpt BX and RX with her own oncologist. Spoke to son in detail yesterday 6.15 PM  .

## 2020-10-15 NOTE — PROGRESS NOTE ADULT - SUBJECTIVE AND OBJECTIVE BOX
[facility]   MD MARCELLO MCBRIDE MD RUORU WONG, PA    TEL:  OFFICE: [phone]  DR SANCHEZ CELL: [phone]  FRANCISCO JAVIER MILLER CELL: [phone]  DR. TORRES CELL: [phone]  DR. CHÁVEZ CELL: [phone]    FROM 5 PM - 7 AM PLEASE CALL ANSWERING SERVICE: [phone]    RENAL FOLLOW UP NOTE--Date of Service 10-15-20 @ 11:13  --------------------------------------------------------------------------------  HPI:      Pt seen and examined at bedside.   Denies SOB, chest pain     PAST HISTORY  --------------------------------------------------------------------------------  No significant changes to PMH, PSH, FHx, SHx, unless otherwise noted    ALLERGIES & MEDICATIONS  --------------------------------------------------------------------------------  Allergies    Benadryl (Swelling)    Intolerances      Standing Inpatient Medications  amLODIPine   Tablet 5 milliGRAM(s) Oral daily  cefTRIAXone   IVPB      cefTRIAXone   IVPB 1000 milliGRAM(s) IV Intermittent every 24 hours  enoxaparin Injectable 80 milliGRAM(s) SubCutaneous every 12 hours  influenza   Vaccine 0.5 milliLiter(s) IntraMuscular once  potassium phosphate IVPB 30 milliMole(s) IV Intermittent once  sodium chloride 0.9%. 1000 milliLiter(s) IV Continuous <Continuous>    PRN Inpatient Medications  magnesium hydroxide Suspension 30 milliLiter(s) Oral daily PRN      REVIEW OF SYSTEMS  --------------------------------------------------------------------------------  General: no fever  CVS: no chest pain  RESP: no sob, no cough    MSK: no edema     VITALS/PHYSICAL EXAM  --------------------------------------------------------------------------------  T(C): 36.7 (10-15-20 @ 04:46), Max: 36.8 (10-14-20 @ 13:29)  HR: 85 (10-15-20 @ 04:46) (82 - 90)  BP: 138/85 (10-15-20 @ 04:46) (119/83 - 138/85)  RR: 18 (10-15-20 @ 04:46) (16 - 18)  SpO2: 100% (10-15-20 @ 04:46) (97% - 100%)  Wt(kg): --        Physical Exam:  	Gen: NAD  	HEENT: MMM  	Pulm: CTA B/L  	CV: S1S2  	Abd: Soft, +BS  	Ext: No LE edema B/L                      Neuro: Awake   	Skin: Warm and Dry   	Vascular access: no hd catheter           no doris  LABS/STUDIES  --------------------------------------------------------------------------------              12.7   8.91  >-----------<  253      [10-15-20 @ 06:39]              37.4     132  |  102  |  12  ----------------------------<  91      [10-15-20 @ 06:39]  4.5   |  22  |  0.85        Ca     10.2     [10-15-20 @ 06:39]      Mg     1.9     [10-15-20 @ 06:39]      Phos  1.6     [10-15-20 @ 06:40]    TPro  6.8  /  Alb  2.2  /  TBili  1.1  /  DBili  x   /  AST  164  /  ALT  79  /  AlkPhos  192  [10-15-20 @ 06:39]          Creatinine Trend:  SCr 0.85 [10-15 @ 06:39]  SCr 0.71 [10-14 @ 07:10]  SCr 0.86 [10-13 @ 07:27]  SCr 0.99 [10-12 @ 09:59]  SCr 1.10 [10-11 @ 11:08]    Urinalysis - [10-10-20 @ 22:28]      Color Yellow / Appearance Slightly Turbid / SG 1.015 / pH 5.0      Gluc Negative / Ketone Negative  / Bili Negative / Urobili Negative       Blood Trace / Protein 15 / Leuk Est Small / Nitrite Positive      RBC 2-5 / WBC 6-10 / Hyaline  / Gran  / Sq Epi  / Non Sq Epi Moderate / Bacteria TNTC      Iron 46, TIBC 191, %sat 24      [10-11-20 @ 11:08]  Ferritin 796      [10-11-20 @ 15:34]  Vitamin D (25OH) 40.8      [10-11-20 @ 15:34]  TSH 0.50      [10-11-20 @ 11:08]  Lipid: chol 94, TG 50, HDL 53, LDL 31      [10-11-20 @ 11:08]

## 2020-10-17 LAB — CANCER AG27-29 SERPL-ACNC: 227 U/ML — HIGH (ref 0–38.6)

## 2020-10-26 LAB — PTH RELATED PROT SERPL-MCNC: <2 PMOL/L — SIGNIFICANT CHANGE UP

## 2021-06-30 ENCOUNTER — APPOINTMENT (OUTPATIENT)
Dept: CT IMAGING | Facility: HOSPITAL | Age: 86
End: 2021-06-30
